# Patient Record
Sex: FEMALE | Race: WHITE | NOT HISPANIC OR LATINO
[De-identification: names, ages, dates, MRNs, and addresses within clinical notes are randomized per-mention and may not be internally consistent; named-entity substitution may affect disease eponyms.]

---

## 2021-04-14 ENCOUNTER — APPOINTMENT (OUTPATIENT)
Dept: VASCULAR SURGERY | Facility: CLINIC | Age: 36
End: 2021-04-14
Payer: MEDICAID

## 2021-04-14 VITALS — DIASTOLIC BLOOD PRESSURE: 78 MMHG | SYSTOLIC BLOOD PRESSURE: 119 MMHG | HEART RATE: 73 BPM

## 2021-04-14 VITALS — BODY MASS INDEX: 14.94 KG/M2 | WEIGHT: 92.99 LBS | TEMPERATURE: 98 F | HEIGHT: 66 IN

## 2021-04-14 PROCEDURE — 93970 EXTREMITY STUDY: CPT

## 2021-04-14 PROCEDURE — 99072 ADDL SUPL MATRL&STAF TM PHE: CPT

## 2021-04-14 PROCEDURE — 99203 OFFICE O/P NEW LOW 30 MIN: CPT

## 2021-04-30 ENCOUNTER — TRANSCRIPTION ENCOUNTER (OUTPATIENT)
Age: 36
End: 2021-04-30

## 2021-05-14 ENCOUNTER — APPOINTMENT (OUTPATIENT)
Dept: VASCULAR SURGERY | Facility: CLINIC | Age: 36
End: 2021-05-14
Payer: MEDICAID

## 2021-05-14 PROCEDURE — 99072 ADDL SUPL MATRL&STAF TM PHE: CPT

## 2021-05-14 PROCEDURE — 36478 ENDOVENOUS LASER 1ST VEIN: CPT | Mod: LT

## 2021-06-02 ENCOUNTER — APPOINTMENT (OUTPATIENT)
Dept: VASCULAR SURGERY | Facility: CLINIC | Age: 36
End: 2021-06-02
Payer: COMMERCIAL

## 2021-06-02 PROCEDURE — 93971 EXTREMITY STUDY: CPT

## 2021-06-02 PROCEDURE — 99213 OFFICE O/P EST LOW 20 MIN: CPT

## 2021-08-13 ENCOUNTER — APPOINTMENT (OUTPATIENT)
Dept: VASCULAR SURGERY | Facility: CLINIC | Age: 36
End: 2021-08-13

## 2021-09-10 ENCOUNTER — APPOINTMENT (OUTPATIENT)
Dept: VASCULAR SURGERY | Facility: CLINIC | Age: 36
End: 2021-09-10
Payer: COMMERCIAL

## 2021-09-10 PROCEDURE — 99213 OFFICE O/P EST LOW 20 MIN: CPT

## 2021-09-10 PROCEDURE — 93971 EXTREMITY STUDY: CPT

## 2021-09-10 NOTE — ASSESSMENT
[FreeTextEntry1] : The patient is a 36 her old female status post left leg endovenous laser ablation of her incompetent greater saphenous vein. The patient tolerated the procedure well. I performed a venous duplex which shows a closed saphenous vein. 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 For the time being she will continue with compression stocking therapy

## 2021-09-10 NOTE — HISTORY OF PRESENT ILLNESS
[FreeTextEntry1] : Status post left leg endovenous laser ablation 2 months ago of an incompetent greater saphenous vein. The patient tolerated the procedure . today she presents complaining of a small area in her ankle and calf but has a residual tributary veins.

## 2021-12-27 ENCOUNTER — ASOB RESULT (OUTPATIENT)
Age: 36
End: 2021-12-27

## 2021-12-27 ENCOUNTER — OUTPATIENT (OUTPATIENT)
Dept: OUTPATIENT SERVICES | Facility: HOSPITAL | Age: 36
LOS: 1 days | Discharge: HOME | End: 2021-12-27

## 2021-12-27 ENCOUNTER — APPOINTMENT (OUTPATIENT)
Dept: ANTEPARTUM | Facility: CLINIC | Age: 36
End: 2021-12-27
Payer: COMMERCIAL

## 2021-12-27 PROCEDURE — 76811 OB US DETAILED SNGL FETUS: CPT | Mod: 26

## 2021-12-27 PROCEDURE — 76817 TRANSVAGINAL US OBSTETRIC: CPT | Mod: 26,59

## 2021-12-27 PROCEDURE — ZZZZZ: CPT

## 2022-01-03 ENCOUNTER — NON-APPOINTMENT (OUTPATIENT)
Age: 37
End: 2022-01-03

## 2022-01-06 ENCOUNTER — APPOINTMENT (OUTPATIENT)
Dept: OBGYN | Facility: CLINIC | Age: 37
End: 2022-01-06
Payer: COMMERCIAL

## 2022-01-06 VITALS
DIASTOLIC BLOOD PRESSURE: 72 MMHG | BODY MASS INDEX: 29.54 KG/M2 | HEIGHT: 72 IN | WEIGHT: 218.13 LBS | SYSTOLIC BLOOD PRESSURE: 120 MMHG

## 2022-01-06 PROCEDURE — 0500F INITIAL PRENATAL CARE VISIT: CPT

## 2022-01-30 ENCOUNTER — NON-APPOINTMENT (OUTPATIENT)
Age: 37
End: 2022-01-30

## 2022-02-03 ENCOUNTER — APPOINTMENT (OUTPATIENT)
Dept: OBGYN | Facility: CLINIC | Age: 37
End: 2022-02-03
Payer: COMMERCIAL

## 2022-02-03 VITALS
WEIGHT: 223.13 LBS | HEIGHT: 72 IN | SYSTOLIC BLOOD PRESSURE: 108 MMHG | BODY MASS INDEX: 30.22 KG/M2 | DIASTOLIC BLOOD PRESSURE: 68 MMHG

## 2022-02-03 PROCEDURE — 0502F SUBSEQUENT PRENATAL CARE: CPT

## 2022-02-03 RX ORDER — FAMOTIDINE 20 MG/1
20 TABLET, FILM COATED ORAL
Qty: 60 | Refills: 6 | Status: ACTIVE | COMMUNITY
Start: 2022-02-03 | End: 1900-01-01

## 2022-02-08 LAB
ALBUMIN SERPL ELPH-MCNC: 3.8 G/DL
ALP BLD-CCNC: 38 U/L
ALT SERPL-CCNC: 18 U/L
ANION GAP SERPL CALC-SCNC: 13 MMOL/L
AST SERPL-CCNC: 14 U/L
BASOPHILS # BLD AUTO: 0.02 K/UL
BASOPHILS NFR BLD AUTO: 0.3 %
BILIRUB SERPL-MCNC: 0.7 MG/DL
BUN SERPL-MCNC: 8 MG/DL
CALCIUM SERPL-MCNC: 9.2 MG/DL
CHLORIDE SERPL-SCNC: 103 MMOL/L
CO2 SERPL-SCNC: 21 MMOL/L
CREAT SERPL-MCNC: 0.5 MG/DL
EOSINOPHIL # BLD AUTO: 0.08 K/UL
EOSINOPHIL NFR BLD AUTO: 1.2 %
GLUCOSE 1H P 50 G GLC PO SERPL-MCNC: 192 MG/DL
GLUCOSE SERPL-MCNC: 192 MG/DL
HCT VFR BLD CALC: 29.3 %
HGB BLD-MCNC: 9.2 G/DL
IMM GRANULOCYTES NFR BLD AUTO: 1.7 %
LYMPHOCYTES # BLD AUTO: 1.94 K/UL
LYMPHOCYTES NFR BLD AUTO: 29.8 %
MAN DIFF?: NORMAL
MCHC RBC-ENTMCNC: 19.7 PG
MCHC RBC-ENTMCNC: 31.4 G/DL
MCV RBC AUTO: 62.6 FL
MONOCYTES # BLD AUTO: 0.38 K/UL
MONOCYTES NFR BLD AUTO: 5.8 %
NEUTROPHILS # BLD AUTO: 3.97 K/UL
NEUTROPHILS NFR BLD AUTO: 61.2 %
PLATELET # BLD AUTO: 219 K/UL
POTASSIUM SERPL-SCNC: 4.4 MMOL/L
PROT SERPL-MCNC: 6.6 G/DL
RBC # BLD: 4.68 M/UL
RBC # FLD: 17.6 %
SODIUM SERPL-SCNC: 137 MMOL/L
WBC # FLD AUTO: 6.5 K/UL

## 2022-02-10 LAB
BILEACIDS T: 2.2 UMOL/L
CHENDEOXYCHOLIC ACID: 0.9 UMOL/L
CHOLIC ACID: 0.6 UMOL/L
DEOXYCHOLIC ACID: 0.7 UMOL/L
URSODEOXYCH: <0.1 UMOL/L

## 2022-02-14 ENCOUNTER — NON-APPOINTMENT (OUTPATIENT)
Age: 37
End: 2022-02-14

## 2022-02-14 LAB
GLUCOSE 1H P 100 G GLC PO SERPL-MCNC: 234 MG/DL
GLUCOSE 2H P CHAL SERPL-MCNC: 208 MG/DL
GLUCOSE 3H P CHAL SERPL-MCNC: 114 MG/DL
GLUCOSE BS SERPL-MCNC: 114 MG/DL

## 2022-02-23 ENCOUNTER — NON-APPOINTMENT (OUTPATIENT)
Age: 37
End: 2022-02-23

## 2022-02-24 ENCOUNTER — APPOINTMENT (OUTPATIENT)
Dept: ANTEPARTUM | Facility: CLINIC | Age: 37
End: 2022-02-24
Payer: COMMERCIAL

## 2022-02-24 ENCOUNTER — APPOINTMENT (OUTPATIENT)
Dept: NUTRITION | Facility: CLINIC | Age: 37
End: 2022-02-24

## 2022-02-24 ENCOUNTER — OUTPATIENT (OUTPATIENT)
Dept: OUTPATIENT SERVICES | Facility: HOSPITAL | Age: 37
LOS: 1 days | Discharge: HOME | End: 2022-02-24

## 2022-02-24 ENCOUNTER — RESULT CHARGE (OUTPATIENT)
Age: 37
End: 2022-02-24

## 2022-02-24 VITALS
DIASTOLIC BLOOD PRESSURE: 68 MMHG | HEART RATE: 83 BPM | OXYGEN SATURATION: 100 % | SYSTOLIC BLOOD PRESSURE: 105 MMHG | BODY MASS INDEX: 36.32 KG/M2 | WEIGHT: 225 LBS | TEMPERATURE: 98.1 F

## 2022-02-24 DIAGNOSIS — I83.893 VARICOSE VEINS OF BILATERAL LOWER EXTREMITIES WITH OTHER COMPLICATIONS: ICD-10-CM

## 2022-02-24 LAB
BILIRUB UR QL STRIP: NORMAL
BP DIAS: 68 MM HG
BP SYS: 105 MM HG
CLARITY UR: CLEAR
COLLECTION METHOD: NORMAL
FETAL MOVEMENT: PRESENT
GLUCOSE BLDC GLUCOMTR-MCNC: 91
GLUCOSE BLDC GLUCOMTR-MCNC: 91 MG/DL — SIGNIFICANT CHANGE UP (ref 70–99)
GLUCOSE UR-MCNC: NORMAL
HBA1C MFR BLD HPLC: 6.2
HCG UR QL: 0.2 EU/DL
HGB UR QL STRIP.AUTO: NORMAL
KETONES UR-MCNC: NORMAL
LEUKOCYTE ESTERASE UR QL STRIP: NORMAL
NITRITE UR QL STRIP: NORMAL
OB COMMENTS: NORMAL
PH UR STRIP: 6
PROT UR STRIP-MCNC: NORMAL
SCHEDULED VISIT: YES
SP GR UR STRIP: 1.01
URINE ALBUMIN/PROTEIN: NORMAL
URINE GLUCOSE: NORMAL
URINE KETONES: NORMAL
WEEKS GESTATION: 27.5

## 2022-02-24 PROCEDURE — 99214 OFFICE O/P EST MOD 30 MIN: CPT

## 2022-02-24 RX ORDER — DIAZEPAM 2 MG/1
2 TABLET ORAL
Qty: 1 | Refills: 0 | Status: DISCONTINUED | COMMUNITY
Start: 2021-04-14 | End: 2022-02-24

## 2022-02-24 RX ORDER — CALCIUM CARBONATE 260MG(650)
TABLET,CHEWABLE ORAL
Refills: 0 | Status: DISCONTINUED | COMMUNITY
End: 2022-02-24

## 2022-02-24 RX ORDER — BLOOD-GLUCOSE METER
W/DEVICE EACH MISCELLANEOUS
Qty: 1 | Refills: 0 | Status: ACTIVE | COMMUNITY
Start: 2022-02-24 | End: 1900-01-01

## 2022-02-24 RX ORDER — SYRING-NEEDL,DISP,INSUL,0.3 ML 30 GX5/16"
SYRINGE, EMPTY DISPOSABLE MISCELLANEOUS
Qty: 1 | Refills: 0 | Status: ACTIVE | COMMUNITY
Start: 2022-02-24 | End: 1900-01-01

## 2022-02-24 NOTE — END OF VISIT
[FreeTextEntry3] : [facility] Staff\par \par I saw and evaluated Ms. BRONSON with Dr. Field.\par \par Counseling: \par \par 1.  The patient will be evaluated by a nutritionist and instructed in adhering to a diabetic diet.\par \par 2.  She was counseled by a nurse and instructed in capillary blood glucose testing (fasting and 2 hours after each meal).\par \par 3.  The significance and usual management of diabetes in pregnancy were reviewed, including risks of preeclampsia, Intra-Uterine Fetal Demise, macrosomia, birth trauma, pre-term labor,  section, NICU admission (the main reasons include  hypoglycemia and  jaundice) and the possible need for insulin therapy.\par \par 4.  Exercise was encouraged.  Ideally, she should walk briskly  after each meal.\par \par Recommendations:\par \par 1.  Glycemic Control.  Target glucose ranges to minimize excessive fetal growth are:  Fasting 60-90 mg/dl; preprandial  mg/dl; and 2-hour postprandial < 120 mg/dl.  If these values are elevated, insulin therapy is encouraged, although oral hypoglycemic agents are reasonable to try depending on the finger stick log.\par \par 2.  Antepartum testing.  Daily fetal movement counts are recommended from 28 weeks.  Weekly or twice weekly biophysical profiles are recommended, the timing will depend on glucose control.  Ultrasound assessment of fetal growth and macrosomic features is recommended.\par \par 3.  Timing of Delivery.  Delivery will most likely occur at around 39 weeks gestation.   If complications, such as preeclampsia, macrosomia or poor glucose control develop, then delivery plans may need to be revised.\par \par 4.  Route of delivery.  Diabetes is associated with about a six-fold risk for shoulder dystocia (which includes the risk of irreversible nerve, bone, and brain injury).  Operative vaginal deliveries should be approached with caution if at all.  Given that she has had 2 previous  deliveries most likely she will have a repeat  delivery.\par \par 5.  Glucose control in labor.  During labor, capillary glucose values should be checked every few hours (depending on their stability).  Insulin may be required to cover elevated glucose levels.\par \par 6.  Long-term diabetes surveillance.  The risk of developing diabetes outside of pregnancy over the next five years may be as high as 50%.  I recommend that she have a 2 hour GTT or similar diabetes screen  at 6 to 12 weeks postpartum with her primary Obstetrician and counseling about ways to minimize her risk.  If her fasting glucose is normal, annual glucose screening by her primary care physician is advised.\par \par Monitor hemoglobin and hematocrit; consider iron studies\par \par Prenatal care is with Dr. Porter\par Fetal movement and labor precautions discussed.\par Estimated fetal weight in two weeks.\par Follow up in two weeks with the fingerstick log.\par \par Ms. Bronson expressed understanding and all of her questions were answered to her satisfaction.  Thank you for this consult.\par \par Mo Banuelos MD\par \par \par \par \par \par \par \par

## 2022-02-24 NOTE — SURGICAL HISTORY
[Breast Disease] : breast disease [Cysts] : cysts [Fibroids] : no fibroids [Abn Paps] : no abnormal pap smears [STI's] : no STI's [Infertility] : no infertility [de-identified] : History of fibroadenoma, s/p lumpectomy, benign. S/p left ovarian cystectomy

## 2022-02-24 NOTE — PHYSICAL EXAM
[FreeTextEntry1] : General: In no apparent distress. \par \par HEENT:  Atraumatic.  Extraocular muscles intact.  \par \par Cardiovascular: Regular rate and rhythm, normal S1/S2 \par \par Pulmonary: Clear to auscultation bilaterally.  No wheezing. \par \par Abdomen: soft, gravid, nontender, nondistended, no rebound, no guarding \par \par Extremities: no calf tenderness or edema \par \par Neurology: +2 reflexes in bilateral upper extremities \par \par Psychiatry:  Happy mood.  Awake, alert, oriented to person, place, time.

## 2022-02-24 NOTE — OB HISTORY
[Pregnancy History] : patient received anesthesia [LIBERTY: ___] : LIBERTY: [unfilled] [___] : no pregnancy complications reported

## 2022-02-24 NOTE — HISTORY OF PRESENT ILLNESS
[FreeTextEntry1] : 36yo  @27w5d by LIBERTY 22 by first trimester sonogram, presents for initial consult for GDM, referred by and co-managed with Dr. Porter\par \par Patient feels well. Denies contractions, vaginal bleeding, leakage of fluid. Reports good fetal movement. \par Denies fever, chills, nausea, vomiting, chest pain, shortness of breath, dysuria, abnormal discharge, leg swelling. \par \par 2/3 ;\par  /234/208/114\par Random FS today: 91 (4 hrs post candy, wheat bread and cheese)\par \par A1C today: 6.2%\par \par OB Hx:\par FT LTCS x 2, patient cannot remember the birthweights\par SAB x 3, D&C x1\par \par Patient denies history of diabetes with previous pregnancies. \par Reports history of uterine septum, s/p hysteroscopic septoplasty in [place]. \par Last sono-@19w2d EFw 318g, MVP 4.55cm, posterior placenta, no previa, no anatomical abnormalities, CL 3.8cm

## 2022-02-24 NOTE — DISCUSSION/SUMMARY
[FreeTextEntry1] : 36yo  @27w5d by LIBERTY 22 by first trimester sonogram, presents for initial consult for GDM, referred by and co-managed with Dr. Porter\par \par #GDM \par - diabetic teaching and nutrition counseling \par - Discussed risks of GDM including but not limited to: macrosomia, shoulder dystocia, increased risk of , stillbirth, NICU admission for hypoglycemia and jaundice, and preeclampsia. Explained that if sugars are well controlled the above complications decrease. \par - FS goal fasting <95, 2 hour PP<120. Encouraged to document FS. \par - Discussed increased risk of type II DM later in life and that we will screen for that after pregnancy.\par -Random FS today: 91 (4hrs post candy, wheat bread and cheese)\par -A1C today: 6.2%\par -sent all supplies to pharmacy\par \par #Obesity \par -We discussed that obesity is associated with a host of pregnancy complications.  \par -The patient is at increased risk for preeclampsia and gestational hypertension,  delivery, macrosomia, gestational diabetes, deep venous thrombosis,  delivery, stillbirth and certain birth defects, such as open neural tube defects. \par - I recommend weekly biophysical profiles to start at around 32 weeks gestation. \par -patient instructed to start taking ASA daily\par \par #Beta-thal minor\par -s/p genetic counseling\par -last h/h 9.2/29.3, MCV 62.6\par - patient taking Iron tabs TID\par -sent colace to pharmacy\par \par #Pregnancy\par -Prenatal care is with Dr. Porter\par -outside medical record reviewed:\par AFP low risk; NIPT low risk/ CF low risk\par Lead <1\par AB pos\par HCV negative\par HBsAg/RPR neg\par Mumps nonimmune\par Measles/Rubella immune\par Hg electrophoresis Beta thal minor\par TSH 1.20\par -anatomy sono normal\par \par RTC 2 weeks with FS logs\par \par \par \par \par

## 2022-02-25 DIAGNOSIS — O34.219 MATERNAL CARE FOR UNSPECIFIED TYPE SCAR FROM PREVIOUS CESAREAN DELIVERY: ICD-10-CM

## 2022-02-25 DIAGNOSIS — Z3A.27 27 WEEKS GESTATION OF PREGNANCY: ICD-10-CM

## 2022-02-25 DIAGNOSIS — O24.419 GESTATIONAL DIABETES MELLITUS IN PREGNANCY, UNSPECIFIED CONTROL: ICD-10-CM

## 2022-02-25 DIAGNOSIS — O09.522 SUPERVISION OF ELDERLY MULTIGRAVIDA, SECOND TRIMESTER: ICD-10-CM

## 2022-02-25 DIAGNOSIS — O99.012 ANEMIA COMPLICATING PREGNANCY, SECOND TRIMESTER: ICD-10-CM

## 2022-02-28 ENCOUNTER — NON-APPOINTMENT (OUTPATIENT)
Age: 37
End: 2022-02-28

## 2022-03-10 ENCOUNTER — APPOINTMENT (OUTPATIENT)
Dept: ANTEPARTUM | Facility: CLINIC | Age: 37
End: 2022-03-10
Payer: COMMERCIAL

## 2022-03-10 ENCOUNTER — RESULT CHARGE (OUTPATIENT)
Age: 37
End: 2022-03-10

## 2022-03-10 ENCOUNTER — NON-APPOINTMENT (OUTPATIENT)
Age: 37
End: 2022-03-10

## 2022-03-10 ENCOUNTER — ASOB RESULT (OUTPATIENT)
Age: 37
End: 2022-03-10

## 2022-03-10 ENCOUNTER — APPOINTMENT (OUTPATIENT)
Dept: OBGYN | Facility: CLINIC | Age: 37
End: 2022-03-10
Payer: COMMERCIAL

## 2022-03-10 ENCOUNTER — OUTPATIENT (OUTPATIENT)
Dept: OUTPATIENT SERVICES | Facility: HOSPITAL | Age: 37
LOS: 1 days | Discharge: HOME | End: 2022-03-10

## 2022-03-10 VITALS
SYSTOLIC BLOOD PRESSURE: 110 MMHG | HEIGHT: 66 IN | WEIGHT: 220.38 LBS | BODY MASS INDEX: 35.42 KG/M2 | DIASTOLIC BLOOD PRESSURE: 66 MMHG

## 2022-03-10 VITALS
SYSTOLIC BLOOD PRESSURE: 116 MMHG | HEART RATE: 86 BPM | BODY MASS INDEX: 35.91 KG/M2 | TEMPERATURE: 98 F | DIASTOLIC BLOOD PRESSURE: 69 MMHG | WEIGHT: 222.5 LBS | OXYGEN SATURATION: 98 %

## 2022-03-10 LAB
BILIRUB UR QL STRIP: NORMAL
BP DIAS: 69 MM HG
BP SYS: 116 MM HG
CLARITY UR: CLEAR
COLLECTION METHOD: NORMAL
FETAL MOVEMENT: PRESENT
GLUCOSE BLDC GLUCOMTR-MCNC: 115
GLUCOSE BLDC GLUCOMTR-MCNC: 115 MG/DL — HIGH (ref 70–99)
GLUCOSE UR-MCNC: NORMAL
HCG UR QL: 0.1 EU/DL
HGB UR QL STRIP.AUTO: NORMAL
KETONES UR-MCNC: NORMAL
LEUKOCYTE ESTERASE UR QL STRIP: NORMAL
NITRITE UR QL STRIP: NORMAL
OB COMMENTS: NORMAL
PH UR STRIP: 6.5
PROT UR STRIP-MCNC: NORMAL
SCHEDULED VISIT: YES
SP GR UR STRIP: 1.02
URINE ALBUMIN/PROTEIN: NORMAL
URINE GLUCOSE: NORMAL
URINE KETONES: NORMAL
WEEKS GESTATION: 29.5

## 2022-03-10 PROCEDURE — 0502F SUBSEQUENT PRENATAL CARE: CPT

## 2022-03-10 PROCEDURE — 76817 TRANSVAGINAL US OBSTETRIC: CPT | Mod: 26

## 2022-03-10 PROCEDURE — 76816 OB US FOLLOW-UP PER FETUS: CPT | Mod: 26

## 2022-03-10 PROCEDURE — 99214 OFFICE O/P EST MOD 30 MIN: CPT | Mod: 25

## 2022-03-10 RX ORDER — LANCETS 33 GAUGE
EACH MISCELLANEOUS
Qty: 120 | Refills: 4 | Status: ACTIVE | COMMUNITY
Start: 2022-02-24 | End: 1900-01-01

## 2022-03-10 RX ORDER — PEN NEEDLE, DIABETIC 32GX 5/32"
32G X 4 MM NEEDLE, DISPOSABLE MISCELLANEOUS
Qty: 100 | Refills: 3 | Status: ACTIVE | COMMUNITY
Start: 2022-03-10 | End: 1900-01-01

## 2022-03-10 RX ORDER — LANOLIN ALCOHOL/MO/W.PET/CERES
4 CREAM (GRAM) TOPICAL
Qty: 4 | Refills: 0 | Status: ACTIVE | COMMUNITY
Start: 2022-03-10 | End: 1900-01-01

## 2022-03-10 RX ORDER — ISOPROPYL ALCOHOL 70 ML/100ML
SWAB TOPICAL
Qty: 100 | Refills: 5 | Status: ACTIVE | COMMUNITY
Start: 2022-03-10 | End: 1900-01-01

## 2022-03-10 RX ORDER — BLOOD SUGAR DIAGNOSTIC
STRIP MISCELLANEOUS 4 TIMES DAILY
Qty: 120 | Refills: 6 | Status: ACTIVE | COMMUNITY
Start: 2022-02-24 | End: 1900-01-01

## 2022-03-10 RX ORDER — INSULIN HUMAN 100 [IU]/ML
100 INJECTION, SUSPENSION SUBCUTANEOUS AT BEDTIME
Qty: 5 | Refills: 0 | Status: ACTIVE | COMMUNITY
Start: 2022-03-10 | End: 1900-01-01

## 2022-03-10 NOTE — HISTORY OF PRESENT ILLNESS
[FreeTextEntry1] : 38yo  @29w5d by LIBERTY 22 by first trimester sonogram, presents for follow-up for GDM, referred by and co-managed with Dr. Porter\par \par Patient feels well. Reports feeling of pelvic pressure. Ordered by Dr. Porter to have TVUS today. Denies contractions, vaginal bleeding, leakage of fluid. Reports good fetal movement. \par Denies fever, chills, nausea, vomiting, chest pain, shortness of breath, dysuria, abnormal discharge, leg swelling. \par \par -Random FS today: 115 (3hrs after whole wheat bread and meat)\par FS logs reviewed:\par **fastin-135 \par **2hr postprandial: \par \par Historical values:\par A1C 6.2%\par 2/3 ;\par  /234/208/114\par  \par \par

## 2022-03-10 NOTE — OB HISTORY
[FreeTextEntry1] : Past Pregnancies: \par #1 (14):  delivery of unknown girl, at 39 weeks GA . patient received anesthesia. Delivery occurred at. ([place]) . no pregnancy complications reported. Pregnancy #1 Comments: (Primary  for breech presentation). \par #2 (18):  delivery of unknown boy, at 39 weeks GA . patient received anesthesia. Delivery occurred at. ([place]) . no pregnancy complications reported. Pregnancy #2 Comments: (Category 2 tracing, failed TOLAC). \par \par Current Pregnancy: LIBERTY: 2022.

## 2022-03-10 NOTE — END OF VISIT
[FreeTextEntry3] : [facility] Staff\par \par I saw and evaluated Ms. BRONSON with Dr. Field.\par \par GDM\par - Fasting values:  108-135\par - 2 hour postprandial values:  \par \par Not all of the values are filled in; the more recent postprandial values are better controlled.\par \par Insulin is the first line medication to treat elevated blood sugars in pregnancy.  It does not cross the placenta.  It can cause hypoglycemia.  Metformin is an alternative medication  that can be used.  It does cross the placenta.  Short term data regarding the use of Metformin in pregnancy and the effects on the  have been reassuring.  However long term data are lacking.  Metformin can cause GI upset, and a CMP should be checked when using Metformin.Ms. Bronson desires to start insulin.  We will start NPH 10 units at night (this is less than the weight based regimen as she has not been on insulin before).  I will not start NPH in the morning or rapid acting insulin with meals yet because Ms. Bronson is making dietary modifications.  Hypoglycemia precautions were discussed.  Glucagon and glucose tablets will be sent to her pharmacy.\par \par Prenatal care is with Dr. Porter.\par Fetal movement and labor precautions were discussed.\par Weekly biophysical profiles with non stress tests to start at around 32 weeks gestation.\par Follow up in one week with the fingerstick log.\par \par Mo Banuelos MD\par \par \par \par \par

## 2022-03-10 NOTE — PHYSICAL EXAM
[FreeTextEntry1] : On physical exam, General: In no apparent distress. \par \par HEENT: Atraumatic. Extraocular muscles intact. \par \par Cardiovascular: Regular rate and rhythm, normal S1/S2 \par \par Pulmonary: Clear to auscultation bilaterally. No wheezing. \par \par Abdomen: soft, gravid, nontender, nondistended, no rebound, no guarding \par \par Extremities: no calf tenderness or edema \par \par Neurology: +2 reflexes in bilateral upper extremities \par \par Psychiatry: Happy mood. Awake, alert, oriented to person, place, time.

## 2022-03-10 NOTE — DISCUSSION/SUMMARY
[FreeTextEntry1] : 36yo  @29w5d by LIBERTY 22 by first trimester sonogram, presents for follow-up for GDM, referred by and co-managed with Dr. Porter\par \par #GDM \par - diabetic teaching and nutrition counseling \par - previously discussed risks of GDM including but not limited to: macrosomia, shoulder dystocia, increased risk of , stillbirth, NICU admission for hypoglycemia and jaundice, and preeclampsia. Explained that if sugars are well controlled the above complications decrease. \par - FS goal fasting <95, 2 hour PP<120. Encouraged to document FS. \par - Discussed increased risk of type II DM later in life and that we will screen for that after pregnancy.\par -Random FS today: 115 (3hrs after whole wheat bread and meat)\par FS logs reviewed:\par **fastin-135 \par **2hr postprandial: \par - A1C 6.2%\par -****Will start patient on NPH 10U at bedtime\par -all supplies sent to pharmacy\par \par #Obesity \par -We discussed that obesity is associated with a host of pregnancy complications. \par -The patient is at increased risk for preeclampsia and gestational hypertension,  delivery, macrosomia, gestational diabetes, deep venous thrombosis,  delivery, stillbirth and certain birth defects, such as open neural tube defects. \par - I recommend weekly biophysical profiles to start at around 32 weeks gestation. \par -continue taking ASA daily\par \par #Beta-thal minor - FOB is negative\par -s/p genetic counseling\par -last h/h 9.2/29.3, MCV 62.6\par - patient taking Iron tabs TID\par -sent Iron studies\par \par #Pregnancy\par -Prenatal care is with Dr. Porter\par -AFP low risk; NIPT low risk/ CF low risk\par -anatomy sono normal\par -f/u Today's sonogram\par \par RTC 2 weeks with FS logs

## 2022-03-15 LAB
ALBUMIN SERPL ELPH-MCNC: 4.1 G/DL
ALP BLD-CCNC: 45 U/L
ALT SERPL-CCNC: 28 U/L
ANION GAP SERPL CALC-SCNC: 18 MMOL/L
AST SERPL-CCNC: 18 U/L
BILIRUB SERPL-MCNC: 0.6 MG/DL
BUN SERPL-MCNC: 13 MG/DL
CALCIUM SERPL-MCNC: 9.7 MG/DL
CHLORIDE SERPL-SCNC: 102 MMOL/L
CO2 SERPL-SCNC: 19 MMOL/L
CREAT SERPL-MCNC: 0.6 MG/DL
EGFR: 118 ML/MIN/1.73M2
FERRITIN SERPL-MCNC: 138 NG/ML
FOLATE SERPL-MCNC: 15 NG/ML
GLUCOSE SERPL-MCNC: 97 MG/DL
IRON SATN MFR SERPL: 18 %
IRON SERPL-MCNC: 70 UG/DL
POTASSIUM SERPL-SCNC: 4.5 MMOL/L
PROT SERPL-MCNC: 6.8 G/DL
SODIUM SERPL-SCNC: 139 MMOL/L
TIBC SERPL-MCNC: 382 UG/DL
TRANSFERRIN SERPL-MCNC: 317 MG/DL
UIBC SERPL-MCNC: 312 UG/DL
VIT B12 SERPL-MCNC: 207 PG/ML

## 2022-03-16 ENCOUNTER — NON-APPOINTMENT (OUTPATIENT)
Age: 37
End: 2022-03-16

## 2022-03-17 ENCOUNTER — RESULT CHARGE (OUTPATIENT)
Age: 37
End: 2022-03-17

## 2022-03-17 ENCOUNTER — OUTPATIENT (OUTPATIENT)
Dept: OUTPATIENT SERVICES | Facility: HOSPITAL | Age: 37
LOS: 1 days | Discharge: HOME | End: 2022-03-17

## 2022-03-17 ENCOUNTER — APPOINTMENT (OUTPATIENT)
Dept: NUTRITION | Facility: CLINIC | Age: 37
End: 2022-03-17

## 2022-03-17 ENCOUNTER — APPOINTMENT (OUTPATIENT)
Dept: ANTEPARTUM | Facility: CLINIC | Age: 37
End: 2022-03-17
Payer: COMMERCIAL

## 2022-03-17 VITALS
DIASTOLIC BLOOD PRESSURE: 59 MMHG | SYSTOLIC BLOOD PRESSURE: 116 MMHG | TEMPERATURE: 98.2 F | WEIGHT: 220 LBS | OXYGEN SATURATION: 100 % | HEIGHT: 66 IN | BODY MASS INDEX: 35.36 KG/M2 | HEART RATE: 83 BPM

## 2022-03-17 LAB
BP DIAS: 59 MM HG
BP SYS: 116 MM HG
FETAL HEART RATE (BPM): 152
FETAL MOVEMENT: PRESENT
OB COMMENTS: NORMAL
SCHEDULED VISIT: YES
URINE ALBUMIN/PROTEIN: NORMAL
URINE GLUCOSE: 100
URINE KETONES: NORMAL
WEEKS GESTATION: 30.5

## 2022-03-17 PROCEDURE — 99213 OFFICE O/P EST LOW 20 MIN: CPT | Mod: 25

## 2022-03-17 RX ORDER — MECOBALAMIN 1000 MCG
1 TABLET,CHEWABLE ORAL DAILY
Qty: 30 | Refills: 2 | Status: ACTIVE | COMMUNITY
Start: 2022-03-17 | End: 1900-01-01

## 2022-03-17 RX ORDER — METFORMIN HYDROCHLORIDE 500 MG/1
500 TABLET, COATED ORAL
Qty: 60 | Refills: 1 | Status: ACTIVE | COMMUNITY
Start: 2022-03-17 | End: 1900-01-01

## 2022-03-18 DIAGNOSIS — O26.899 OTHER SPECIFIED PREGNANCY RELATED CONDITIONS, UNSPECIFIED TRIMESTER: ICD-10-CM

## 2022-03-18 DIAGNOSIS — O24.414 GESTATIONAL DIABETES MELLITUS IN PREGNANCY, INSULIN CONTROLLED: ICD-10-CM

## 2022-03-18 DIAGNOSIS — Z3A.29 29 WEEKS GESTATION OF PREGNANCY: ICD-10-CM

## 2022-03-18 DIAGNOSIS — O09.523 SUPERVISION OF ELDERLY MULTIGRAVIDA, THIRD TRIMESTER: ICD-10-CM

## 2022-03-18 DIAGNOSIS — D56.3 THALASSEMIA MINOR: ICD-10-CM

## 2022-03-22 LAB
BILIRUB UR QL STRIP: NORMAL
CLARITY UR: CLEAR
COLLECTION METHOD: NORMAL
GLUCOSE BLDC GLUCOMTR-MCNC: 133
GLUCOSE UR-MCNC: 100
HCG UR QL: 0.2 EU/DL
HGB UR QL STRIP.AUTO: NORMAL
KETONES UR-MCNC: NORMAL
LEUKOCYTE ESTERASE UR QL STRIP: NORMAL
NITRITE UR QL STRIP: NORMAL
PH UR STRIP: 6.5
PROT UR STRIP-MCNC: NORMAL
SP GR UR STRIP: 1.02

## 2022-03-22 NOTE — HISTORY OF PRESENT ILLNESS
[FreeTextEntry1] : 36yo  @30w5d by LIBERTY 22 by first trimester sonogram, presents for follow-up for GDM, referred by and co-managed with Dr. Porter\par \par Patient feels well. Denies contractions, vaginal bleeding, leakage of fluid. Reports good fetal movement. \par Denies fever, chills, nausea, vomiting, chest pain, shortness of breath, dysuria, abnormal discharge, leg swelling. \par \par Patient was started on NPH 10U at bedtime 1 week ago. She reports self-injecting every night since 3/11 without issues. However, she reports experiencing cramping on her toes and one episode of lightheadedness with vision changes. She did not take her FS at that time. \par \par -Random FS today: 133 (2.5hrs postprandial)\par 3/12- FS logs reviewed:\par **fastin-132 (5 of 6 elevated values)\par **2hr postprandial:  (7 of 15 values elevated)\par \par Historical values:\par A1C 6.2%\par 2/3 ;\par  /234/208/114

## 2022-03-22 NOTE — OB HISTORY
[FreeTextEntry1] : Past Pregnancies: \par #1 (14):  delivery of unknown girl, at 39 weeks GA . patient received anesthesia. Delivery occurred at. ([place]) . no pregnancy complications reported. Pregnancy #1 Comments: (Primary  for breech presentation). \par #2 (18):  delivery of unknown boy, at 39 weeks GA . patient received anesthesia. Delivery occurred at. ([place]) . no pregnancy complications reported. Pregnancy #2 Comments: (Category 2 tracing, failed TOLAC). \par \par Current Pregnancy: LIBERTY: 2022.

## 2022-03-22 NOTE — DISCUSSION/SUMMARY
[FreeTextEntry1] : 38yo  @30w5d by LIBERTY 22 by first trimester sonogram, presents for follow-up for GDM, referred by and co-managed with Dr. Porter\par \par #GDMA2 \par - diabetic teaching and nutrition counseling \par - FS goal fasting <95, 2 hour PP<120. Encouraged to document FS. \par - Discussed increased risk of type II DM later in life and that we will screen for that after pregnancy.\par -Random FS today: 133 (2.5hrs postprandial)\par 3/12- FS logs reviewed:\par **fastin-132 (5 of 6 elevated values)\par **2hr postprandial:  (7 of 15 values elevated)\par CURRENT REGIMEN: NPH 10U QHS\par NEW REGIMEN; NPH 10U QHS, Metformin 500mg BID\par -Will start patient on Dexcom monitor next visit-- supplies sent to pharmacy\par \par #Obesity - BMI 35\par -We discussed that obesity is associated with a host of pregnancy complications. \par -The patient is at increased risk for preeclampsia and gestational hypertension,  delivery, macrosomia, gestational diabetes, deep venous thrombosis,  delivery, stillbirth and certain birth defects, such as open neural tube defects. \par -continue taking ASA daily\par \par #Beta-thal minor - FOB is negative\par -s/p genetic counseling\par -last h/h 9.2/29.3, MCV 62.6\par - patient taking Iron tabs TID\par -Iron studies sent from last visit: normal iron, ferritin, and folate levels, low B12\par -sent B12 pills to pharmacy\par \par #Pregnancy\par -Prenatal care is with Dr. Porter\par -AFP low risk; NIPT low risk/ CF low risk\par -anatomy sono normal\par -Last EFW at 29w5d 1489g (46%)\par \par RTC 1 week with FS logs\par \par

## 2022-03-23 ENCOUNTER — NON-APPOINTMENT (OUTPATIENT)
Age: 37
End: 2022-03-23

## 2022-03-24 ENCOUNTER — OUTPATIENT (OUTPATIENT)
Dept: OUTPATIENT SERVICES | Facility: HOSPITAL | Age: 37
LOS: 1 days | Discharge: HOME | End: 2022-03-24

## 2022-03-24 ENCOUNTER — APPOINTMENT (OUTPATIENT)
Dept: NUTRITION | Facility: CLINIC | Age: 37
End: 2022-03-24

## 2022-03-24 ENCOUNTER — APPOINTMENT (OUTPATIENT)
Dept: ANTEPARTUM | Facility: CLINIC | Age: 37
End: 2022-03-24
Payer: COMMERCIAL

## 2022-03-24 ENCOUNTER — RESULT CHARGE (OUTPATIENT)
Age: 37
End: 2022-03-24

## 2022-03-24 ENCOUNTER — APPOINTMENT (OUTPATIENT)
Dept: OBGYN | Facility: CLINIC | Age: 37
End: 2022-03-24
Payer: COMMERCIAL

## 2022-03-24 VITALS
DIASTOLIC BLOOD PRESSURE: 72 MMHG | WEIGHT: 222 LBS | BODY MASS INDEX: 35.68 KG/M2 | SYSTOLIC BLOOD PRESSURE: 122 MMHG | HEIGHT: 66 IN

## 2022-03-24 LAB
BP DIAS: 76 MM HG
BP SYS: 120 MM HG
FETAL MOVEMENT: PRESENT
OB COMMENTS: NORMAL
SCHEDULED VISIT: YES
URINE ALBUMIN/PROTEIN: NORMAL
URINE GLUCOSE: NORMAL
URINE KETONES: NORMAL
WEEKS GESTATION: 31.5

## 2022-03-24 PROCEDURE — 0502F SUBSEQUENT PRENATAL CARE: CPT

## 2022-03-24 PROCEDURE — 99213 OFFICE O/P EST LOW 20 MIN: CPT

## 2022-03-28 NOTE — HISTORY OF PRESENT ILLNESS
[FreeTextEntry1] : 36yo  @31w5d by LIBERTY 22 by first trimester sonogram, presents for follow-up for GDMa2, referred by and co-managed with Dr. Porter\par \par Patient feels well. Denies contractions, vaginal bleeding, leakage of fluid. Reports good fetal movement. \par Denies fever, chills, nausea, vomiting, chest pain, shortness of breath, dysuria, abnormal discharge, leg swelling. \par \par Patient was started on NPH 10U at bedtime 2 weeks ago. Metforming 500mg q12hr added 1 week ago for persistently high fasting and post dinner values. Dexcom was sent to pharmacy and patient has it set up today.\par \par -Random FS today: 129 (2.5hrs postprandial)\par - logs reviewed\par **fastin-127 (all elevated values)\par **2hr postprandial:  (5 of 17 values elevated)\par \par Historical values:\par  A1C 6.2%\par 2/3 ;\par  /234/208/114

## 2022-03-28 NOTE — OB HISTORY
[FreeTextEntry1] : Past Pregnancies: \par #1 (14):  delivery of unknown girl, at 39 weeks GA . patient received anesthesia. Delivery occurred at. ([place]) . no pregnancy complications reported. Pregnancy #1 Comments: (Primary  for breech presentation). \par #2 (18):  delivery of unknown boy, at 39 weeks GA . patient received anesthesia. Delivery occurred at. ([place]) . no pregnancy complications reported. Pregnancy #2 Comments: (Category 2 tracing, failed TOLAC). \par \par Current Pregnancy: LIBERTY: 2022.

## 2022-03-28 NOTE — END OF VISIT
[] : Resident [FreeTextEntry3] : 38 y/o P2 at 31w5d with GDM, feeling well, no complaints, good FM.\par Improved control since last week. Increase Metformin to 500mg/ 1000mg. Continue NPH 10U HS.\par Starting Dexcom.\par RTC in 1 w. Start  testing at 32 weeks.

## 2022-03-28 NOTE — PHYSICAL EXAM
[FreeTextEntry1] : On physical exam, On physical exam, On physical exam, General: In no apparent distress. \par \par HEENT: Atraumatic. Extraocular muscles intact. \par \par Cardiovascular: Regular rate and rhythm, normal S1/S2 \par \par Pulmonary: Clear to auscultation bilaterally. No wheezing. \par \par Abdomen: soft, gravid, nontender, nondistended, no rebound, no guarding \par \par Extremities: no calf tenderness or edema \par \par Neurology: +2 reflexes in bilateral upper extremities \par \par Psychiatry: Happy mood. Awake, alert, oriented to person, place, time.

## 2022-03-28 NOTE — DISCUSSION/SUMMARY
[FreeTextEntry1] : 36yo  @31w5d by LIBERTY 22 by first trimester sonogram, presents for follow-up for GDM, referred by and co-managed with Dr. Porter\par \par #GDMA2 \par - diabetic teaching and nutrition counseling \par - Dexcom set up today\par - FS goal fasting <95, 2 hour PP<120. Encouraged to document FS. \par - Discussed increased risk of type II DM later in life and that we will screen for that after pregnancy.\par -Random FS today: 129 (2.5hrs postprandial)\par - logs reviewed\par **fastin-127 (all elevated values)\par **2hr postprandial:  (5 of 17 values elevated)\par CURRENT REGIMEN; NPH 10U QHS, Metformin 500mg BID\par NEW REGIMEN: NPH 10U QHS, Metformin 500mg IN AM, 1000mg in PM\par \par #Obesity - BMI 35\par -The patient is at increased risk for preeclampsia and gestational hypertension,  delivery, macrosomia, gestational diabetes, deep venous thrombosis,  delivery, stillbirth and certain birth defects, such as open neural tube defects. \par -continue taking ASA daily\par \par #Beta-thal minor - FOB is negative\par -s/p genetic counseling\par -last h/h 9.2/29.3, MCV 62.6\par - patient taking Iron tabs TID, Vit B12 daily\par -Iron studies sent from last visit: normal iron, ferritin, and folate levels, low B12\par \par #Pregnancy\par -Prenatal care is with Dr. Porter\par -AFP low risk; NIPT low risk/ CF low risk\par -anatomy sono normal\par -Last EFW at 29w5d 1489g (46%)\par \par RTC 1 week , will start weekly BPPs/NSTs

## 2022-03-30 ENCOUNTER — NON-APPOINTMENT (OUTPATIENT)
Age: 37
End: 2022-03-30

## 2022-03-31 ENCOUNTER — APPOINTMENT (OUTPATIENT)
Dept: ANTEPARTUM | Facility: CLINIC | Age: 37
End: 2022-03-31
Payer: COMMERCIAL

## 2022-03-31 ENCOUNTER — OUTPATIENT (OUTPATIENT)
Dept: OUTPATIENT SERVICES | Facility: HOSPITAL | Age: 37
LOS: 1 days | Discharge: HOME | End: 2022-03-31

## 2022-03-31 ENCOUNTER — ASOB RESULT (OUTPATIENT)
Age: 37
End: 2022-03-31

## 2022-03-31 VITALS
DIASTOLIC BLOOD PRESSURE: 73 MMHG | TEMPERATURE: 98.3 F | WEIGHT: 225 LBS | OXYGEN SATURATION: 100 % | HEART RATE: 96 BPM | SYSTOLIC BLOOD PRESSURE: 116 MMHG | BODY MASS INDEX: 36.32 KG/M2

## 2022-03-31 DIAGNOSIS — O24.414 GESTATIONAL DIABETES MELLITUS IN PREGNANCY, INSULIN CONTROLLED: ICD-10-CM

## 2022-03-31 DIAGNOSIS — Z3A.32 32 WEEKS GESTATION OF PREGNANCY: ICD-10-CM

## 2022-03-31 LAB
FETAL MOVEMENT: PRESENT
OB COMMENTS: NORMAL
SCHEDULED VISIT: YES
URINE ALBUMIN/PROTEIN: NORMAL
URINE GLUCOSE: NEGATIVE
URINE KETONES: NORMAL
WEEKS GESTATION: 32.5

## 2022-03-31 PROCEDURE — 76818 FETAL BIOPHYS PROFILE W/NST: CPT | Mod: 26

## 2022-03-31 PROCEDURE — ZZZZZ: CPT

## 2022-03-31 PROCEDURE — 99213 OFFICE O/P EST LOW 20 MIN: CPT | Mod: 25

## 2022-03-31 RX ORDER — INSULIN LISPRO 100 [IU]/ML
100 INJECTION, SOLUTION SUBCUTANEOUS
Qty: 5 | Refills: 0 | Status: ACTIVE | COMMUNITY
Start: 2022-03-31 | End: 1900-01-01

## 2022-04-04 NOTE — PHYSICAL EXAM
[FreeTextEntry1] : On physical exam, On physical exam, On physical exam, On physical exam, General: In no apparent distress. \par \par HEENT: Atraumatic. Extraocular muscles intact. \par \par Cardiovascular: Regular rate and rhythm, normal S1/S2 \par \par Pulmonary: Clear to auscultation bilaterally. No wheezing. \par \par Abdomen: soft, gravid, nontender, nondistended, no rebound, no guarding \par \par Extremities: no calf tenderness or edema \par \par Neurology: +2 reflexes in bilateral upper extremities \par \par Psychiatry: Happy mood. Awake, alert, oriented to person, place, time. \par

## 2022-04-04 NOTE — OB HISTORY
[FreeTextEntry1] : Past Pregnancies: \par #1 (14):  delivery of unknown girl, at 39 weeks GA . patient received anesthesia. Delivery occurred at. ([place]) . no pregnancy complications reported. Pregnancy #1 Comments: (Primary  for breech presentation). \par #2 (18):  delivery of unknown boy, at 39 weeks GA . patient received anesthesia. Delivery occurred at. ([place]) . no pregnancy complications reported. Pregnancy #2 Comments: (Category 2 tracing, failed TOLAC). \par \par Current Pregnancy: LIBERTY: 2022.

## 2022-04-04 NOTE — END OF VISIT
[] : Resident [FreeTextEntry3] : GDM, doing well.\par CGM reviewd, elevated post breakfast and post-dinner. Add short acting insulin. \par Skipping lunch, advised that she can't skip lunch or at least needs a snack.\par Hypoglycemia precautions.\par Weekly  testing.\par RTC in 2 w.

## 2022-04-04 NOTE — DISCUSSION/SUMMARY
[FreeTextEntry1] : 36yo  @32w5d by LIBERTY 22 by first trimester sonogram, presents for follow-up for GDMA2, referred by and co-managed with Dr. Porter\par \par #GDMA2 \par - diabetic teaching and nutrition counseling \par - Dexcom in place\par - FS goal fasting <95, 2 hour PP<120. Encouraged to document FS. \par - Discussed increased risk of type II DM later in life and that we will screen for that after pregnancy.\par -Random FS today: 101 (last meal chicken with lettuce and tomatoes 2 hours ago)\par Dexcom in place: Most values within expected range, post-breakfast and post-dinner values above normal. Patient skips lunch sometimes, some low values noted around 60-70s. Discussed covering post-meal values with fast-acting. \par CURRENT REGIMEN: NPH 10U QHS, Metformin 500mg IN AM, 1000mg in PM\par NEW REGIMEN:  NPH 10U QHS, Lispro 4/x/4, Metformin 500mg IN AM, 1000mg in PM\par \par #Obesity - BMI 35\par -The patient is at increased risk for preeclampsia and gestational hypertension,  delivery, macrosomia, gestational diabetes, deep venous thrombosis,  delivery, stillbirth and certain birth defects, such as open neural tube defects. \par -continue taking ASA daily\par \par #Beta-thal minor - FOB is negative\par -s/p genetic counseling\par -last h/h 9.2/29.3, MCV 62.6\par - patient taking Iron tabs TID, Vit B12 daily\par \par #Pregnancy\par -Prenatal care is with Dr. Porter\par -AFP low risk; NIPT low risk/ CF low risk\par -anatomy sono normal\par -Last EFW at 29w5d 1489g (46%)\par - f/u  testing tbd today\par \par RTC 2 weeks , continue weekly BPPs/NSTs

## 2022-04-07 ENCOUNTER — APPOINTMENT (OUTPATIENT)
Dept: ANTEPARTUM | Facility: CLINIC | Age: 37
End: 2022-04-07
Payer: MEDICAID

## 2022-04-07 ENCOUNTER — RESULT CHARGE (OUTPATIENT)
Age: 37
End: 2022-04-07

## 2022-04-07 ENCOUNTER — ASOB RESULT (OUTPATIENT)
Age: 37
End: 2022-04-07

## 2022-04-07 ENCOUNTER — APPOINTMENT (OUTPATIENT)
Dept: OBGYN | Facility: CLINIC | Age: 37
End: 2022-04-07
Payer: MEDICAID

## 2022-04-07 ENCOUNTER — OUTPATIENT (OUTPATIENT)
Dept: OUTPATIENT SERVICES | Facility: HOSPITAL | Age: 37
LOS: 1 days | Discharge: HOME | End: 2022-04-07

## 2022-04-07 VITALS
OXYGEN SATURATION: 100 % | TEMPERATURE: 98.2 F | SYSTOLIC BLOOD PRESSURE: 110 MMHG | WEIGHT: 223 LBS | HEART RATE: 87 BPM | BODY MASS INDEX: 35.99 KG/M2 | DIASTOLIC BLOOD PRESSURE: 67 MMHG

## 2022-04-07 VITALS
HEIGHT: 66 IN | DIASTOLIC BLOOD PRESSURE: 67 MMHG | SYSTOLIC BLOOD PRESSURE: 110 MMHG | BODY MASS INDEX: 35.84 KG/M2 | WEIGHT: 223 LBS

## 2022-04-07 LAB
BILIRUB UR QL STRIP: NEGATIVE
CLARITY UR: CLEAR
COLLECTION METHOD: NORMAL
FETAL HEART DESCRIPTION: NORMAL
FETAL HEART RATE (BPM): 140
FETAL MOVEMENT: PRESENT
GLUCOSE BLDC GLUCOMTR-MCNC: 74
GLUCOSE BLDC GLUCOMTR-MCNC: 74 MG/DL — SIGNIFICANT CHANGE UP (ref 70–99)
GLUCOSE UR-MCNC: NEGATIVE
HCG UR QL: 0.2 EU/DL
HGB UR QL STRIP.AUTO: NEGATIVE
KETONES UR-MCNC: NEGATIVE
LEUKOCYTE ESTERASE UR QL STRIP: NEGATIVE
NITRITE UR QL STRIP: NEGATIVE
OB COMMENTS: NORMAL
PH UR STRIP: 6
PROT UR STRIP-MCNC: NEGATIVE
SCHEDULED VISIT: YES
SP GR UR STRIP: 1.02
URINE ALBUMIN/PROTEIN: NEGATIVE
URINE GLUCOSE: NEGATIVE
URINE KETONES: NEGATIVE
WEEKS GESTATION: 33.5

## 2022-04-07 PROCEDURE — 99214 OFFICE O/P EST MOD 30 MIN: CPT

## 2022-04-07 PROCEDURE — 76818 FETAL BIOPHYS PROFILE W/NST: CPT | Mod: 26

## 2022-04-07 PROCEDURE — 76816 OB US FOLLOW-UP PER FETUS: CPT | Mod: 26

## 2022-04-07 PROCEDURE — ZZZZZ: CPT

## 2022-04-07 PROCEDURE — 99214 OFFICE O/P EST MOD 30 MIN: CPT | Mod: 25

## 2022-04-07 NOTE — DISCUSSION/SUMMARY
[FreeTextEntry1] : 38yo  @33w5d by LIBERTY 22 by first trimester sonogram, presents for follow-up for GDMA2, referred by and co-managed with Dr. Porter\par \par #GDMA2 \par - Dexcom in place.  I was able to print out the graphs.  She seems to have low values  after breakfast and occasionally over night.\par . \par CURRENT REGIMEN: NPH 10 unit  QHS, Metformin 500mg in AM, 1000mg in PM, Lispro 4 units with breakfast, and 4 units with dinner.\par NEW REGIMEN:  NPH 4 units with breakfast, 14 units at bedtime.  Rapid acting insulin 4 units with dinner.   She should eat a small ricecake with peanut butter before she goes to bed. 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 Porter\par -AFP low risk; NIPT low risk/ CF low risk\par -anatomy sono normal\par -Last EFW at 33w5d 2257g (42%)\par -Fetal movement and labor precautions discussed.\par -Weekly biophysical profiles with non stress tests\par -RTC 1 week with the fingerstick log.\par \par Mo Banuelos MD

## 2022-04-07 NOTE — HISTORY OF PRESENT ILLNESS
[FreeTextEntry1] : 38yo  @33w5d by LIBERTY 22 by first trimester sonogram, presents for follow-up for GDMA2, referred by and co-managed with Dr. Porter\par \par Patient feels well. Denies contractions, vaginal bleeding, leakage of fluid. Reports good fetal movement. \par Denies fever, chills, nausea, vomiting, chest pain, shortness of breath, dysuria, abnormal discharge, leg swelling.   She is feeling more tired.  She is still skipping some meals by lunch.\par \par Patient is currently on NPH 10U at bedtime,. Metformin 500 mg in AM, and 1000 mg in PM, Lispro 4 units with breakfast and 4 units with dinner.  She ran out of Metformin today.\par \par -Random FS today: 74\par Dexcom in place.  I was able to print out the graphs.  She seems to have low values  after breakfast and occasionally over night.\par \par Historical values:\par  A1C 6.2%\par 2/3 ;\par  /234/208/114

## 2022-04-08 LAB
BILIRUB UR QL STRIP: NORMAL
CLARITY UR: CLEAR
COLLECTION METHOD: NORMAL
GLUCOSE BLDC GLUCOMTR-MCNC: 129
GLUCOSE UR-MCNC: NORMAL
HCG UR QL: 0.2 EU/DL
HGB UR QL STRIP.AUTO: NORMAL
KETONES UR-MCNC: NORMAL
LEUKOCYTE ESTERASE UR QL STRIP: NORMAL
NITRITE UR QL STRIP: NORMAL
PH UR STRIP: 6
PROT UR STRIP-MCNC: NORMAL
SP GR UR STRIP: 1.03

## 2022-04-12 DIAGNOSIS — O24.414 GESTATIONAL DIABETES MELLITUS IN PREGNANCY, INSULIN CONTROLLED: ICD-10-CM

## 2022-04-12 DIAGNOSIS — O34.219 MATERNAL CARE FOR UNSPECIFIED TYPE SCAR FROM PREVIOUS CESAREAN DELIVERY: ICD-10-CM

## 2022-04-12 DIAGNOSIS — Z3A.33 33 WEEKS GESTATION OF PREGNANCY: ICD-10-CM

## 2022-04-13 ENCOUNTER — NON-APPOINTMENT (OUTPATIENT)
Age: 37
End: 2022-04-13

## 2022-04-14 ENCOUNTER — APPOINTMENT (OUTPATIENT)
Dept: ANTEPARTUM | Facility: CLINIC | Age: 37
End: 2022-04-14
Payer: MEDICAID

## 2022-04-14 ENCOUNTER — OUTPATIENT (OUTPATIENT)
Dept: OUTPATIENT SERVICES | Facility: HOSPITAL | Age: 37
LOS: 1 days | Discharge: HOME | End: 2022-04-14

## 2022-04-14 ENCOUNTER — ASOB RESULT (OUTPATIENT)
Age: 37
End: 2022-04-14

## 2022-04-14 ENCOUNTER — RESULT CHARGE (OUTPATIENT)
Age: 37
End: 2022-04-14

## 2022-04-14 VITALS
SYSTOLIC BLOOD PRESSURE: 110 MMHG | OXYGEN SATURATION: 100 % | HEIGHT: 66 IN | BODY MASS INDEX: 35.84 KG/M2 | TEMPERATURE: 98.2 F | HEART RATE: 100 BPM | WEIGHT: 223 LBS | DIASTOLIC BLOOD PRESSURE: 69 MMHG

## 2022-04-14 DIAGNOSIS — O24.419 GESTATIONAL DIABETES MELLITUS IN PREGNANCY, UNSPECIFIED CONTROL: ICD-10-CM

## 2022-04-14 DIAGNOSIS — O34.219 MATERNAL CARE FOR UNSPECIFIED TYPE SCAR FROM PREVIOUS CESAREAN DELIVERY: ICD-10-CM

## 2022-04-14 LAB
BILIRUB UR QL STRIP: NORMAL
BP DIAS: 69 MM HG
BP SYS: 110 MM HG
CLARITY UR: CLEAR
COLLECTION METHOD: NORMAL
FETAL HEART RATE (BPM): 143
FETAL MOVEMENT: PRESENT
GLUCOSE BLDC GLUCOMTR-MCNC: 141
GLUCOSE UR-MCNC: NORMAL
HCG UR QL: 0.2 EU/DL
HGB UR QL STRIP.AUTO: NORMAL
KETONES UR-MCNC: NORMAL
LEUKOCYTE ESTERASE UR QL STRIP: NORMAL
NITRITE UR QL STRIP: NORMAL
OB COMMENTS: NORMAL
PH UR STRIP: 5
PROT UR STRIP-MCNC: NORMAL
SCHEDULED VISIT: YES
SP GR UR STRIP: 1.03
URINE ALBUMIN/PROTEIN: NORMAL
URINE GLUCOSE: NORMAL
URINE KETONES: NORMAL
WEEKS GESTATION: 34.5

## 2022-04-14 PROCEDURE — ZZZZZ: CPT

## 2022-04-14 PROCEDURE — 99213 OFFICE O/P EST LOW 20 MIN: CPT | Mod: 25

## 2022-04-14 PROCEDURE — 76818 FETAL BIOPHYS PROFILE W/NST: CPT | Mod: 26

## 2022-04-14 RX ORDER — BLOOD-GLUCOSE TRANSMITTER
EACH MISCELLANEOUS
Qty: 1 | Refills: 0 | Status: ACTIVE | COMMUNITY
Start: 2022-03-17 | End: 1900-01-01

## 2022-04-14 RX ORDER — BLOOD-GLUCOSE SENSOR
EACH MISCELLANEOUS
Qty: 9 | Refills: 2 | Status: ACTIVE | COMMUNITY
Start: 2022-03-17 | End: 1900-01-01

## 2022-04-14 RX ORDER — BLOOD-GLUCOSE,RECEIVER,CONT
EACH MISCELLANEOUS
Qty: 1 | Refills: 2 | Status: ACTIVE | COMMUNITY
Start: 2022-03-17 | End: 1900-01-01

## 2022-04-14 NOTE — OB HISTORY
[FreeTextEntry1] : Past Pregnancies: \par #1 (14):  delivery of unknown girl, at 39 weeks GA . patient received anesthesia. Delivery occurred at. ([place]) . no pregnancy complications reported. Pregnancy #1 Comments: (Primary  for breech presentation). \par #2 (18):  delivery of unknown boy, at 39 weeks GA . patient received anesthesia. Delivery occurred at. ([place]) . no pregnancy complications reported. Pregnancy #2 Comments: (Category 2 tracing, failed TOLAC). \par \par Current Pregnancy: LIBERTY: 2022

## 2022-04-14 NOTE — HISTORY OF PRESENT ILLNESS
[FreeTextEntry1] : 38yo  @34w5d by LIBERTY 22 by first trimester sonogram, presents for follow-up for GDMA2, referred by and co-managed with Dr. Porter\par \par Patient feels well. Denies contractions, vaginal bleeding, leakage of fluid. Reports good fetal movement. \par Denies fever, chills, nausea, vomiting, chest pain, shortness of breath, dysuria, abnormal discharge, leg swelling. She is feeling more energized compared to last visit.\par \par Patient is currently on NPH 4U in AM/ 14U at bedtime, Lispro 4 units with dinner. Metformin was discontinued 1 week ago.\par \par -Random FS today: 141 (2hrs postprandial)\par Dexcom in place.\par \par Historical values:\par  A1C 6.2%\par 2/3 ;\par  /234/208/114

## 2022-04-14 NOTE — PHYSICAL EXAM
[FreeTextEntry1] : General: In no apparent distress. \par \par HEENT: Atraumatic. Extraocular muscles intact. \par \par Cardiovascular: Regular rate and rhythm, normal S1/S2 \par \par Pulmonary: Clear to auscultation bilaterally. No wheezing. \par \par Abdomen: soft, gravid, nontender, nondistended, no rebound, no guarding \par \par Extremities: no calf tenderness or edema \par \par Neurology: +2 reflexes in bilateral upper extremities \par \par Psychiatry: Happy mood. Awake, alert, oriented to person, place, time. \par

## 2022-04-14 NOTE — END OF VISIT
[] : Resident [FreeTextEntry3] : Doing well. No complaints.\par GDM. Dexcom reviewed. Add 4 U Lispro with breakfast and increase to 6U with dinner.\par Follow weekly  testing.

## 2022-04-14 NOTE — DISCUSSION/SUMMARY
[FreeTextEntry1] : 36yo  @34w5d by LIBERTY 22 by first trimester sonogram, presents for follow-up for GDMA2, referred by and co-managed with Dr. Porter\par \par #GDMA2 \par - Dexcom in place. Pattern shows high values with breakfast. Average 116mg/dL, 79% in range, 20% high, 1% low.\par CURRENT REGIMEN: NPH 4 units with breakfast, 14 units at bedtime. 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 Porter\par -AFP low risk; NIPT low risk/ CF low risk\par -anatomy sono normal\par -Last EFW at 33w5d 2257g (42%)\par -Fetal movement and labor precautions discussed.\par -Weekly biophysical profiles with non stress tests\par -Delivery recommended by 39weeks\par \par RTC 1 week \par \par

## 2022-04-15 LAB
BASOPHILS # BLD AUTO: 0.04 K/UL
BASOPHILS NFR BLD AUTO: 0.6 %
EOSINOPHIL # BLD AUTO: 0.1 K/UL
EOSINOPHIL NFR BLD AUTO: 1.4 %
FERRITIN SERPL-MCNC: 100 NG/ML
FOLATE SERPL-MCNC: 10 NG/ML
HCT VFR BLD CALC: 34.7 %
HGB BLD-MCNC: 10.5 G/DL
IMM GRANULOCYTES NFR BLD AUTO: 1.4 %
IRON SATN MFR SERPL: 17 %
IRON SERPL-MCNC: 70 UG/DL
LYMPHOCYTES # BLD AUTO: 2.11 K/UL
LYMPHOCYTES NFR BLD AUTO: 30.3 %
MAN DIFF?: NORMAL
MCHC RBC-ENTMCNC: 19.8 PG
MCHC RBC-ENTMCNC: 30.3 G/DL
MCV RBC AUTO: 65.5 FL
MONOCYTES # BLD AUTO: 0.46 K/UL
MONOCYTES NFR BLD AUTO: 6.6 %
NEUTROPHILS # BLD AUTO: 4.15 K/UL
NEUTROPHILS NFR BLD AUTO: 59.7 %
PLATELET # BLD AUTO: 258 K/UL
RBC # BLD: 5.3 M/UL
RBC # FLD: 16.8 %
TIBC SERPL-MCNC: 423 UG/DL
UIBC SERPL-MCNC: 353 UG/DL
VIT B12 SERPL-MCNC: 306 PG/ML
WBC # FLD AUTO: 6.96 K/UL

## 2022-04-20 ENCOUNTER — NON-APPOINTMENT (OUTPATIENT)
Age: 37
End: 2022-04-20

## 2022-04-21 ENCOUNTER — ASOB RESULT (OUTPATIENT)
Age: 37
End: 2022-04-21

## 2022-04-21 ENCOUNTER — APPOINTMENT (OUTPATIENT)
Dept: ANTEPARTUM | Facility: CLINIC | Age: 37
End: 2022-04-21
Payer: MEDICAID

## 2022-04-21 ENCOUNTER — NON-APPOINTMENT (OUTPATIENT)
Age: 37
End: 2022-04-21

## 2022-04-21 ENCOUNTER — RESULT CHARGE (OUTPATIENT)
Age: 37
End: 2022-04-21

## 2022-04-21 ENCOUNTER — OUTPATIENT (OUTPATIENT)
Dept: OUTPATIENT SERVICES | Facility: HOSPITAL | Age: 37
LOS: 1 days | Discharge: HOME | End: 2022-04-21

## 2022-04-21 VITALS
TEMPERATURE: 98.2 F | HEART RATE: 87 BPM | SYSTOLIC BLOOD PRESSURE: 103 MMHG | DIASTOLIC BLOOD PRESSURE: 61 MMHG | OXYGEN SATURATION: 98 % | BODY MASS INDEX: 36.16 KG/M2 | HEIGHT: 66 IN | WEIGHT: 225 LBS

## 2022-04-21 PROCEDURE — ZZZZZ: CPT

## 2022-04-21 PROCEDURE — 76818 FETAL BIOPHYS PROFILE W/NST: CPT | Mod: 26

## 2022-04-21 PROCEDURE — 99214 OFFICE O/P EST MOD 30 MIN: CPT | Mod: 25

## 2022-04-21 NOTE — OB HISTORY
[FreeTextEntry1] : Past Pregnancies: \par #1 (14):  delivery of unknown girl, at 39 weeks GA . patient received anesthesia. Delivery occurred at. ([place]) . no pregnancy complications reported. Pregnancy #1 Comments: (Primary  for breech presentation). \par #2 (18):  delivery of unknown boy, at 39 weeks GA . patient received anesthesia. Delivery occurred at. ([place]) . no pregnancy complications reported. Pregnancy #2 Comments: (Category 2 tracing, failed TOLAC). \par \par Current Pregnancy: LIBERTY: 2022

## 2022-04-21 NOTE — HISTORY OF PRESENT ILLNESS
[FreeTextEntry1] : 38yo  @35w5d by LIBERTY 22 by first trimester sonogram, presents for follow-up for GDMA2, referred by and co-managed with Dr. Porter\par \par Patient feels well. Denies contractions, vaginal bleeding, leakage of fluid. Reports good fetal movement. \par Denies fever, chills, nausea, vomiting, chest pain, shortness of breath, dysuria, abnormal discharge, leg swelling.\par \par Patient is currently on  NPH 4U in AM/14U QHS; Lispro 4//6. Metformin was discontinued 2 weeks ago.\par \par Dexcom in place. Avg 121mg/dL, 77% in range, <1% in low range, 22% in elevated range. Most with meals.\par Patient is not taking NPH unless she eats in AM-- instructed to take basal no matter what. Advised to always have a snack.\par \par Historical values:\par  A1C 6.2%\par 2/3 ;\par  /234/208/114

## 2022-04-21 NOTE — DISCUSSION/SUMMARY
[FreeTextEntry1] : 36yo  @35w5d by LIBERTY 22 by first trimester sonogram, presents for follow-up for GDMA2, referred by and co-managed with Dr. Porter\par \par #GDMA2 \par - Dexcom in place. Pattern shows high values with breakfast. 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 Porter\par -AFP low risk; NIPT low risk/ CF low risk\par -anatomy sono normal\par -Last EFW at 33w5d 2257g (42%)\par -Fetal movement and labor precautions discussed.\par -Weekly biophysical profiles with non stress tests\par -Dr. Porter would like to schedule repeat  on  (39w3d), this plan is reasonable\par \par RTC 1 week \par

## 2022-04-22 LAB
BILIRUB UR QL STRIP: NORMAL
BP DIAS: 61 MM HG
BP SYS: 103 MM HG
CLARITY UR: CLEAR
COLLECTION METHOD: NORMAL
FETAL HEART RATE (BPM): 140
FETAL MOVEMENT: PRESENT
GLUCOSE UR-MCNC: NORMAL
HCG UR QL: 0.2 EU/DL
HGB UR QL STRIP.AUTO: NORMAL
KETONES UR-MCNC: NORMAL
LEUKOCYTE ESTERASE UR QL STRIP: NORMAL
NITRITE UR QL STRIP: NORMAL
OB COMMENTS: NORMAL
PH UR STRIP: 6
PROT UR STRIP-MCNC: NORMAL
SCHEDULED VISIT: YES
SP GR UR STRIP: 1.01
URINE ALBUMIN/PROTEIN: NORMAL
URINE GLUCOSE: NORMAL
URINE KETONES: NORMAL
WEEKS GESTATION: 35.5

## 2022-04-25 DIAGNOSIS — O09.523 SUPERVISION OF ELDERLY MULTIGRAVIDA, THIRD TRIMESTER: ICD-10-CM

## 2022-04-25 DIAGNOSIS — O24.414 GESTATIONAL DIABETES MELLITUS IN PREGNANCY, INSULIN CONTROLLED: ICD-10-CM

## 2022-04-25 DIAGNOSIS — O34.219 MATERNAL CARE FOR UNSPECIFIED TYPE SCAR FROM PREVIOUS CESAREAN DELIVERY: ICD-10-CM

## 2022-04-27 ENCOUNTER — NON-APPOINTMENT (OUTPATIENT)
Age: 37
End: 2022-04-27

## 2022-04-28 ENCOUNTER — RESULT CHARGE (OUTPATIENT)
Age: 37
End: 2022-04-28

## 2022-04-28 ENCOUNTER — APPOINTMENT (OUTPATIENT)
Dept: ANTEPARTUM | Facility: CLINIC | Age: 37
End: 2022-04-28
Payer: MEDICAID

## 2022-04-28 ENCOUNTER — APPOINTMENT (OUTPATIENT)
Dept: OBGYN | Facility: CLINIC | Age: 37
End: 2022-04-28
Payer: MEDICAID

## 2022-04-28 ENCOUNTER — OUTPATIENT (OUTPATIENT)
Dept: OUTPATIENT SERVICES | Facility: HOSPITAL | Age: 37
LOS: 1 days | Discharge: HOME | End: 2022-04-28

## 2022-04-28 ENCOUNTER — ASOB RESULT (OUTPATIENT)
Age: 37
End: 2022-04-28

## 2022-04-28 VITALS
WEIGHT: 225 LBS | BODY MASS INDEX: 36.16 KG/M2 | DIASTOLIC BLOOD PRESSURE: 68 MMHG | SYSTOLIC BLOOD PRESSURE: 118 MMHG | HEIGHT: 66 IN

## 2022-04-28 DIAGNOSIS — D56.3 THALASSEMIA MINOR: ICD-10-CM

## 2022-04-28 LAB
FETAL HEART DESCRIPTION: NORMAL
FETAL HEART RATE (BPM): 164
FETAL MOVEMENT: PRESENT
GLUCOSE BLDC GLUCOMTR-MCNC: 75 MG/DL — SIGNIFICANT CHANGE UP (ref 70–99)
OB COMMENTS: NORMAL
SCHEDULED VISIT: YES
URINE ALBUMIN/PROTEIN: NEGATIVE
URINE GLUCOSE: NEGATIVE
URINE KETONES: NEGATIVE
WEEKS GESTATION: 36.5

## 2022-04-28 PROCEDURE — 99213 OFFICE O/P EST LOW 20 MIN: CPT | Mod: 25

## 2022-04-28 PROCEDURE — ZZZZZ: CPT

## 2022-04-28 PROCEDURE — 76818 FETAL BIOPHYS PROFILE W/NST: CPT | Mod: 26

## 2022-04-28 PROCEDURE — 99213 OFFICE O/P EST LOW 20 MIN: CPT

## 2022-04-29 DIAGNOSIS — O09.523 SUPERVISION OF ELDERLY MULTIGRAVIDA, THIRD TRIMESTER: ICD-10-CM

## 2022-04-29 DIAGNOSIS — O24.414 GESTATIONAL DIABETES MELLITUS IN PREGNANCY, INSULIN CONTROLLED: ICD-10-CM

## 2022-04-29 DIAGNOSIS — O99.012 ANEMIA COMPLICATING PREGNANCY, SECOND TRIMESTER: ICD-10-CM

## 2022-04-29 DIAGNOSIS — D56.3 THALASSEMIA MINOR: ICD-10-CM

## 2022-04-29 DIAGNOSIS — Z3A.36 36 WEEKS GESTATION OF PREGNANCY: ICD-10-CM

## 2022-05-04 ENCOUNTER — NON-APPOINTMENT (OUTPATIENT)
Age: 37
End: 2022-05-04

## 2022-05-05 ENCOUNTER — APPOINTMENT (OUTPATIENT)
Dept: ANTEPARTUM | Facility: CLINIC | Age: 37
End: 2022-05-05
Payer: MEDICAID

## 2022-05-05 ENCOUNTER — APPOINTMENT (OUTPATIENT)
Dept: OBGYN | Facility: CLINIC | Age: 37
End: 2022-05-05
Payer: MEDICAID

## 2022-05-05 ENCOUNTER — OUTPATIENT (OUTPATIENT)
Dept: OUTPATIENT SERVICES | Facility: HOSPITAL | Age: 37
LOS: 1 days | Discharge: HOME | End: 2022-05-05

## 2022-05-05 ENCOUNTER — ASOB RESULT (OUTPATIENT)
Age: 37
End: 2022-05-05

## 2022-05-05 ENCOUNTER — RESULT CHARGE (OUTPATIENT)
Age: 37
End: 2022-05-05

## 2022-05-05 VITALS
SYSTOLIC BLOOD PRESSURE: 112 MMHG | BODY MASS INDEX: 36.34 KG/M2 | DIASTOLIC BLOOD PRESSURE: 68 MMHG | WEIGHT: 226.13 LBS | HEIGHT: 66 IN

## 2022-05-05 VITALS
DIASTOLIC BLOOD PRESSURE: 68 MMHG | BODY MASS INDEX: 37.93 KG/M2 | SYSTOLIC BLOOD PRESSURE: 117 MMHG | TEMPERATURE: 97.8 F | WEIGHT: 236 LBS | HEART RATE: 88 BPM | HEIGHT: 66 IN | OXYGEN SATURATION: 99 %

## 2022-05-05 VITALS — SYSTOLIC BLOOD PRESSURE: 117 MMHG | DIASTOLIC BLOOD PRESSURE: 68 MMHG

## 2022-05-05 DIAGNOSIS — Z34.90 ENCOUNTER FOR SUPERVISION OF NORMAL PREGNANCY, UNSPECIFIED, UNSPECIFIED TRIMESTER: ICD-10-CM

## 2022-05-05 DIAGNOSIS — O24.419 GESTATIONAL DIABETES MELLITUS IN PREGNANCY, UNSPECIFIED CONTROL: ICD-10-CM

## 2022-05-05 LAB
BILIRUB UR QL STRIP: NORMAL
BP DIAS: 68 MM HG
BP SYS: 117 MM HG
CLARITY UR: CLEAR
COLLECTION METHOD: NORMAL
FETAL HEART RATE (BPM): 148
FETAL MOVEMENT: PRESENT
GLUCOSE BLDC GLUCOMTR-MCNC: 118
GLUCOSE BLDC GLUCOMTR-MCNC: 118 MG/DL — HIGH (ref 70–99)
GLUCOSE UR-MCNC: NORMAL
HCG UR QL: 0.2 EU/DL
HGB UR QL STRIP.AUTO: NORMAL
KETONES UR-MCNC: NORMAL
LEUKOCYTE ESTERASE UR QL STRIP: NORMAL
NITRITE UR QL STRIP: NORMAL
OB COMMENTS: NORMAL
PH UR STRIP: 6
PROT UR STRIP-MCNC: NORMAL
SCHEDULED VISIT: YES
SP GR UR STRIP: 1.02
URINE ALBUMIN/PROTEIN: NORMAL
URINE GLUCOSE: NORMAL
URINE KETONES: NORMAL
WEEKS GESTATION: 37.5

## 2022-05-05 PROCEDURE — 99214 OFFICE O/P EST MOD 30 MIN: CPT

## 2022-05-05 PROCEDURE — ZZZZZ: CPT

## 2022-05-05 PROCEDURE — 99213 OFFICE O/P EST LOW 20 MIN: CPT | Mod: 25

## 2022-05-05 PROCEDURE — 76818 FETAL BIOPHYS PROFILE W/NST: CPT | Mod: 26

## 2022-05-05 PROCEDURE — 76816 OB US FOLLOW-UP PER FETUS: CPT | Mod: 26

## 2022-05-06 NOTE — OB HISTORY
[FreeTextEntry1] : #1 (14):  delivery of unknown girl, at 39 weeks GA . patient received anesthesia. Delivery occurred at. ([place]) . no pregnancy complications reported. Pregnancy #1 Comments: (Primary  for breech presentation). \par #2 (18):  delivery of unknown boy, at 39 weeks GA . patient received anesthesia. Delivery occurred at. ([place]) . no pregnancy complications reported. Pregnancy #2 Comments: (Category 2 tracing, failed TOLAC).

## 2022-05-06 NOTE — PHYSICAL EXAM
[FreeTextEntry1] : \par General: In no apparent distress. \par \par HEENT:  Atraumatic.  Extraocular muscles intact.  \par \par Cardiovascular: Regular rate and rhythm, normal S1/S2 \par \par Pulmonary: Clear to auscultation bilaterally.  No wheezing. \par \par Abdomen: soft, gravid, nontender, nondistended, no rebound, no guarding \par \par Extremities: no calf tenderness or edema \par \par Neurology: +2 reflexes in bilateral upper extremities \par \par Psychiatry:  Happy mood.  Awake, alert, oriented to person, place, time.

## 2022-05-06 NOTE — HISTORY OF PRESENT ILLNESS
[FreeTextEntry1] : 36yo  @37w5d by LIBERTY 22 by first trimester sonogram, presents for follow-up for GDMA2, co-managed with Dr. Porter\par \par Patient feels well. Denies contractions, vaginal bleeding, leakage of fluid. Reports good fetal movement. \par Denies fever, chills, nausea, vomiting, chest pain, shortness of breath, dysuria, abnormal discharge, leg swelling.\par \par Dexcom in place. Avg 118mg/dL, 80% in range, <1% in low range, 19% in elevated range.\par \par Some FS logs recorded:\par *fastin-106\par *2hr postprandial: 118-193 (all values elevated but one)\par \par CURRENT REGIMEN: NPH 9/15, lispro //\par \par Historical values:\par  A1C 6.2%\par 2/3 ;\par  /234/208/114 \par

## 2022-05-06 NOTE — DISCUSSION/SUMMARY
[FreeTextEntry1] : 38yo  @37w5d by LIBERTY 22 by first trimester sonogram, presents for follow-up for GDMA2, co-managed with Dr. Porter\par \par #GDMA2 \par - FS today 118 (2hr after eggs, bread and coffee)\par - Dexcom in place. Values reviewed. Needs calibration as well. Avg 118mg/dL, 80% in range, <1% in low range, 19% in elevated range.\par CURRENT REGIMEN: NPH 9U in AM/15U in PM, Lispro 5/x/7\par NEW REGIMEN: NPH: 10U AM/18U PM, lispro 7/x/7\par \par #Beta-thal minor\par - FOB is negative\par - s/p genetic counseling\par - last h/h 9.2/29.3, MCV 62.6\par - patient taking Iron tabs TID, Vit B12 daily\par \par #Pregnancy: Prenatal care is with Dr. Porter\par - AFP low risk; NIPT low risk; anatomy sono normal\par - Last EFW at 33w5d 2257g (42%)\par - Fetal movement and labor precautions discussed.\par - continue twice weekly BPP/NST \par - recommend delivery between 38w - 39w\par \par Continue twice weekly BPP/NST. RTC in 1 week if not delivered\par \par \par

## 2022-05-07 ENCOUNTER — LABORATORY RESULT (OUTPATIENT)
Age: 37
End: 2022-05-07

## 2022-05-10 ENCOUNTER — INPATIENT (INPATIENT)
Facility: HOSPITAL | Age: 37
LOS: 2 days | Discharge: HOME | End: 2022-05-13
Attending: STUDENT IN AN ORGANIZED HEALTH CARE EDUCATION/TRAINING PROGRAM | Admitting: STUDENT IN AN ORGANIZED HEALTH CARE EDUCATION/TRAINING PROGRAM
Payer: MEDICAID

## 2022-05-10 ENCOUNTER — RESULT REVIEW (OUTPATIENT)
Age: 37
End: 2022-05-10

## 2022-05-10 ENCOUNTER — TRANSCRIPTION ENCOUNTER (OUTPATIENT)
Age: 37
End: 2022-05-10

## 2022-05-10 DIAGNOSIS — Z98.890 OTHER SPECIFIED POSTPROCEDURAL STATES: Chronic | ICD-10-CM

## 2022-05-10 DIAGNOSIS — Z98.891 HISTORY OF UTERINE SCAR FROM PREVIOUS SURGERY: Chronic | ICD-10-CM

## 2022-05-10 LAB
ABO RH CONFIRMATION: SIGNIFICANT CHANGE UP
AMPHET UR-MCNC: NEGATIVE — SIGNIFICANT CHANGE UP
APPEARANCE UR: CLEAR — SIGNIFICANT CHANGE UP
BACTERIA # UR AUTO: NEGATIVE — SIGNIFICANT CHANGE UP
BARBITURATES UR SCN-MCNC: NEGATIVE — SIGNIFICANT CHANGE UP
BASOPHILS # BLD AUTO: 0.03 K/UL — SIGNIFICANT CHANGE UP (ref 0–0.2)
BASOPHILS NFR BLD AUTO: 0.5 % — SIGNIFICANT CHANGE UP (ref 0–1)
BENZODIAZ UR-MCNC: NEGATIVE — SIGNIFICANT CHANGE UP
BILIRUB UR-MCNC: NEGATIVE — SIGNIFICANT CHANGE UP
BLD GP AB SCN SERPL QL: SIGNIFICANT CHANGE UP
BUPRENORPHINE SCREEN, URINE RESULT: NEGATIVE — SIGNIFICANT CHANGE UP
COCAINE METAB.OTHER UR-MCNC: NEGATIVE — SIGNIFICANT CHANGE UP
COLOR SPEC: SIGNIFICANT CHANGE UP
DIFF PNL FLD: NEGATIVE — SIGNIFICANT CHANGE UP
EOSINOPHIL # BLD AUTO: 0.08 K/UL — SIGNIFICANT CHANGE UP (ref 0–0.7)
EOSINOPHIL NFR BLD AUTO: 1.3 % — SIGNIFICANT CHANGE UP (ref 0–8)
EPI CELLS # UR: 3 /HPF — SIGNIFICANT CHANGE UP (ref 0–5)
FENTANYL UR QL: NEGATIVE — SIGNIFICANT CHANGE UP
GLUCOSE BLDC GLUCOMTR-MCNC: 118 MG/DL — HIGH (ref 70–99)
GLUCOSE BLDC GLUCOMTR-MCNC: 78 MG/DL — SIGNIFICANT CHANGE UP (ref 70–99)
GLUCOSE BLDC GLUCOMTR-MCNC: 94 MG/DL — SIGNIFICANT CHANGE UP (ref 70–99)
GLUCOSE UR QL: NEGATIVE — SIGNIFICANT CHANGE UP
HCT VFR BLD CALC: 35 % — LOW (ref 37–47)
HGB BLD-MCNC: 11.3 G/DL — LOW (ref 12–16)
HIV 1 & 2 AB SERPL IA.RAPID: SIGNIFICANT CHANGE UP
HYALINE CASTS # UR AUTO: 5 /LPF — SIGNIFICANT CHANGE UP (ref 0–7)
IMM GRANULOCYTES NFR BLD AUTO: 0.8 % — HIGH (ref 0.1–0.3)
KETONES UR-MCNC: NEGATIVE — SIGNIFICANT CHANGE UP
L&D DRUG SCREEN, URINE: SIGNIFICANT CHANGE UP
LEUKOCYTE ESTERASE UR-ACNC: ABNORMAL
LYMPHOCYTES # BLD AUTO: 1.78 K/UL — SIGNIFICANT CHANGE UP (ref 1.2–3.4)
LYMPHOCYTES # BLD AUTO: 29.9 % — SIGNIFICANT CHANGE UP (ref 20.5–51.1)
MCHC RBC-ENTMCNC: 19.6 PG — LOW (ref 27–31)
MCHC RBC-ENTMCNC: 32.3 G/DL — SIGNIFICANT CHANGE UP (ref 32–37)
MCV RBC AUTO: 60.8 FL — LOW (ref 81–99)
METHADONE UR-MCNC: NEGATIVE — SIGNIFICANT CHANGE UP
MONOCYTES # BLD AUTO: 0.35 K/UL — SIGNIFICANT CHANGE UP (ref 0.1–0.6)
MONOCYTES NFR BLD AUTO: 5.9 % — SIGNIFICANT CHANGE UP (ref 1.7–9.3)
NEUTROPHILS # BLD AUTO: 3.66 K/UL — SIGNIFICANT CHANGE UP (ref 1.4–6.5)
NEUTROPHILS NFR BLD AUTO: 61.6 % — SIGNIFICANT CHANGE UP (ref 42.2–75.2)
NITRITE UR-MCNC: NEGATIVE — SIGNIFICANT CHANGE UP
NRBC # BLD: 0 /100 WBCS — SIGNIFICANT CHANGE UP (ref 0–0)
OPIATES UR-MCNC: NEGATIVE — SIGNIFICANT CHANGE UP
OXYCODONE UR-MCNC: NEGATIVE — SIGNIFICANT CHANGE UP
PCP UR-MCNC: NEGATIVE — SIGNIFICANT CHANGE UP
PH UR: 6 — SIGNIFICANT CHANGE UP (ref 5–8)
PLATELET # BLD AUTO: 278 K/UL — SIGNIFICANT CHANGE UP (ref 130–400)
PRENATAL SYPHILIS TEST: SIGNIFICANT CHANGE UP
PROPOXYPHENE QUALITATIVE URINE RESULT: NEGATIVE — SIGNIFICANT CHANGE UP
PROT UR-MCNC: NEGATIVE — SIGNIFICANT CHANGE UP
RBC # BLD: 5.76 M/UL — HIGH (ref 4.2–5.4)
RBC # FLD: 16 % — HIGH (ref 11.5–14.5)
RBC CASTS # UR COMP ASSIST: 4 /HPF — SIGNIFICANT CHANGE UP (ref 0–4)
SP GR SPEC: 1.01 — SIGNIFICANT CHANGE UP (ref 1.01–1.03)
UROBILINOGEN FLD QL: SIGNIFICANT CHANGE UP
WBC # BLD: 5.95 K/UL — SIGNIFICANT CHANGE UP (ref 4.8–10.8)
WBC # FLD AUTO: 5.95 K/UL — SIGNIFICANT CHANGE UP (ref 4.8–10.8)
WBC UR QL: 8 /HPF — HIGH (ref 0–5)

## 2022-05-10 PROCEDURE — 59514 CESAREAN DELIVERY ONLY: CPT | Mod: U7

## 2022-05-10 PROCEDURE — 88307 TISSUE EXAM BY PATHOLOGIST: CPT | Mod: 26

## 2022-05-10 RX ORDER — DEXTROSE 50 % IN WATER 50 %
25 SYRINGE (ML) INTRAVENOUS ONCE
Refills: 0 | Status: DISCONTINUED | OUTPATIENT
Start: 2022-05-10 | End: 2022-05-13

## 2022-05-10 RX ORDER — SENNA PLUS 8.6 MG/1
2 TABLET ORAL
Refills: 0 | Status: DISCONTINUED | OUTPATIENT
Start: 2022-05-10 | End: 2022-05-13

## 2022-05-10 RX ORDER — FAMOTIDINE 10 MG/ML
20 INJECTION INTRAVENOUS ONCE
Refills: 0 | Status: COMPLETED | OUTPATIENT
Start: 2022-05-10 | End: 2022-05-10

## 2022-05-10 RX ORDER — NALOXONE HYDROCHLORIDE 4 MG/.1ML
0.1 SPRAY NASAL
Refills: 0 | Status: DISCONTINUED | OUTPATIENT
Start: 2022-05-10 | End: 2022-05-13

## 2022-05-10 RX ORDER — ONDANSETRON 8 MG/1
4 TABLET, FILM COATED ORAL EVERY 6 HOURS
Refills: 0 | Status: DISCONTINUED | OUTPATIENT
Start: 2022-05-10 | End: 2022-05-13

## 2022-05-10 RX ORDER — MORPHINE SULFATE 50 MG/1
2 CAPSULE, EXTENDED RELEASE ORAL ONCE
Refills: 0 | Status: DISCONTINUED | OUTPATIENT
Start: 2022-05-10 | End: 2022-05-10

## 2022-05-10 RX ORDER — CEFAZOLIN SODIUM 1 G
2000 VIAL (EA) INJECTION ONCE
Refills: 0 | Status: COMPLETED | OUTPATIENT
Start: 2022-05-10 | End: 2022-05-10

## 2022-05-10 RX ORDER — SODIUM CHLORIDE 9 MG/ML
1000 INJECTION, SOLUTION INTRAVENOUS
Refills: 0 | Status: DISCONTINUED | OUTPATIENT
Start: 2022-05-10 | End: 2022-05-10

## 2022-05-10 RX ORDER — DIPHENHYDRAMINE HCL 50 MG
25 CAPSULE ORAL EVERY 6 HOURS
Refills: 0 | Status: DISCONTINUED | OUTPATIENT
Start: 2022-05-10 | End: 2022-05-13

## 2022-05-10 RX ORDER — OXYCODONE HYDROCHLORIDE 5 MG/1
5 TABLET ORAL
Refills: 0 | Status: COMPLETED | OUTPATIENT
Start: 2022-05-10 | End: 2022-05-17

## 2022-05-10 RX ORDER — DEXTROSE 50 % IN WATER 50 %
15 SYRINGE (ML) INTRAVENOUS ONCE
Refills: 0 | Status: DISCONTINUED | OUTPATIENT
Start: 2022-05-10 | End: 2022-05-13

## 2022-05-10 RX ORDER — INSULIN LISPRO 100/ML
VIAL (ML) SUBCUTANEOUS
Refills: 0 | Status: DISCONTINUED | OUTPATIENT
Start: 2022-05-10 | End: 2022-05-13

## 2022-05-10 RX ORDER — GLUCAGON INJECTION, SOLUTION 0.5 MG/.1ML
1 INJECTION, SOLUTION SUBCUTANEOUS ONCE
Refills: 0 | Status: DISCONTINUED | OUTPATIENT
Start: 2022-05-10 | End: 2022-05-13

## 2022-05-10 RX ORDER — SODIUM CHLORIDE 9 MG/ML
1000 INJECTION, SOLUTION INTRAVENOUS ONCE
Refills: 0 | Status: COMPLETED | OUTPATIENT
Start: 2022-05-10 | End: 2022-05-10

## 2022-05-10 RX ORDER — CITRIC ACID/SODIUM CITRATE 300-500 MG
30 SOLUTION, ORAL ORAL ONCE
Refills: 0 | Status: COMPLETED | OUTPATIENT
Start: 2022-05-10 | End: 2022-05-10

## 2022-05-10 RX ORDER — DEXTROSE 50 % IN WATER 50 %
12.5 SYRINGE (ML) INTRAVENOUS ONCE
Refills: 0 | Status: DISCONTINUED | OUTPATIENT
Start: 2022-05-10 | End: 2022-05-13

## 2022-05-10 RX ORDER — SODIUM CHLORIDE 9 MG/ML
1000 INJECTION, SOLUTION INTRAVENOUS
Refills: 0 | Status: DISCONTINUED | OUTPATIENT
Start: 2022-05-10 | End: 2022-05-13

## 2022-05-10 RX ORDER — OXYCODONE HYDROCHLORIDE 5 MG/1
5 TABLET ORAL ONCE
Refills: 0 | Status: DISCONTINUED | OUTPATIENT
Start: 2022-05-10 | End: 2022-05-13

## 2022-05-10 RX ORDER — OXYTOCIN 10 UNIT/ML
333.33 VIAL (ML) INJECTION
Qty: 20 | Refills: 0 | Status: DISCONTINUED | OUTPATIENT
Start: 2022-05-10 | End: 2022-05-13

## 2022-05-10 RX ORDER — SIMETHICONE 80 MG/1
80 TABLET, CHEWABLE ORAL EVERY 4 HOURS
Refills: 0 | Status: DISCONTINUED | OUTPATIENT
Start: 2022-05-10 | End: 2022-05-13

## 2022-05-10 RX ORDER — SODIUM CHLORIDE 9 MG/ML
1000 INJECTION, SOLUTION INTRAVENOUS
Refills: 0 | Status: DISCONTINUED | OUTPATIENT
Start: 2022-05-10 | End: 2022-05-11

## 2022-05-10 RX ORDER — MAGNESIUM HYDROXIDE 400 MG/1
30 TABLET, CHEWABLE ORAL
Refills: 0 | Status: DISCONTINUED | OUTPATIENT
Start: 2022-05-10 | End: 2022-05-13

## 2022-05-10 RX ORDER — LANOLIN
1 OINTMENT (GRAM) TOPICAL EVERY 6 HOURS
Refills: 0 | Status: DISCONTINUED | OUTPATIENT
Start: 2022-05-10 | End: 2022-05-13

## 2022-05-10 RX ORDER — OXYTOCIN 10 UNIT/ML
333.33 VIAL (ML) INJECTION
Qty: 20 | Refills: 0 | Status: DISCONTINUED | OUTPATIENT
Start: 2022-05-10 | End: 2022-05-10

## 2022-05-10 RX ORDER — TETANUS TOXOID, REDUCED DIPHTHERIA TOXOID AND ACELLULAR PERTUSSIS VACCINE, ADSORBED 5; 2.5; 8; 8; 2.5 [IU]/.5ML; [IU]/.5ML; UG/.5ML; UG/.5ML; UG/.5ML
0.5 SUSPENSION INTRAMUSCULAR ONCE
Refills: 0 | Status: DISCONTINUED | OUTPATIENT
Start: 2022-05-10 | End: 2022-05-13

## 2022-05-10 RX ORDER — KETOROLAC TROMETHAMINE 30 MG/ML
30 SYRINGE (ML) INJECTION EVERY 6 HOURS
Refills: 0 | Status: DISCONTINUED | OUTPATIENT
Start: 2022-05-10 | End: 2022-05-11

## 2022-05-10 RX ORDER — ACETAMINOPHEN 500 MG
975 TABLET ORAL
Refills: 0 | Status: DISCONTINUED | OUTPATIENT
Start: 2022-05-10 | End: 2022-05-13

## 2022-05-10 RX ORDER — ENOXAPARIN SODIUM 100 MG/ML
40 INJECTION SUBCUTANEOUS EVERY 24 HOURS
Refills: 0 | Status: DISCONTINUED | OUTPATIENT
Start: 2022-05-11 | End: 2022-05-13

## 2022-05-10 RX ORDER — DEXAMETHASONE 0.5 MG/5ML
4 ELIXIR ORAL EVERY 6 HOURS
Refills: 0 | Status: DISCONTINUED | OUTPATIENT
Start: 2022-05-10 | End: 2022-05-13

## 2022-05-10 RX ORDER — IBUPROFEN 200 MG
600 TABLET ORAL EVERY 6 HOURS
Refills: 0 | Status: COMPLETED | OUTPATIENT
Start: 2022-05-10 | End: 2023-04-08

## 2022-05-10 RX ADMIN — Medication 30 MILLILITER(S): at 14:49

## 2022-05-10 RX ADMIN — SODIUM CHLORIDE 125 MILLILITER(S): 9 INJECTION, SOLUTION INTRAVENOUS at 23:55

## 2022-05-10 RX ADMIN — SENNA PLUS 2 TABLET(S): 8.6 TABLET ORAL at 18:28

## 2022-05-10 RX ADMIN — Medication 30 MILLIGRAM(S): at 16:11

## 2022-05-10 RX ADMIN — SODIUM CHLORIDE 2000 MILLILITER(S): 9 INJECTION, SOLUTION INTRAVENOUS at 11:25

## 2022-05-10 RX ADMIN — Medication 100 MILLIGRAM(S): at 14:50

## 2022-05-10 RX ADMIN — Medication 30 MILLIGRAM(S): at 23:57

## 2022-05-10 RX ADMIN — FAMOTIDINE 20 MILLIGRAM(S): 10 INJECTION INTRAVENOUS at 14:49

## 2022-05-10 RX ADMIN — Medication 975 MILLIGRAM(S): at 23:45

## 2022-05-10 RX ADMIN — SIMETHICONE 80 MILLIGRAM(S): 80 TABLET, CHEWABLE ORAL at 23:18

## 2022-05-10 RX ADMIN — SIMETHICONE 80 MILLIGRAM(S): 80 TABLET, CHEWABLE ORAL at 18:28

## 2022-05-10 RX ADMIN — Medication 975 MILLIGRAM(S): at 22:39

## 2022-05-10 RX ADMIN — Medication 30 MILLIGRAM(S): at 22:04

## 2022-05-10 NOTE — OB PROVIDER DELIVERY SUMMARY - BABY A: DATE/TIME OF DELIVERY
10-May-2022 15:36
Principal Discharge DX:	Thigh laceration, left, subsequent encounter  Goal:	suture removal

## 2022-05-10 NOTE — OB RN INTRAOPERATIVE NOTE - NSSELHIDDEN_OBGYN_ALL_OB_FT
[NS_DeliveryAttending1_OBGYN_ALL_OB_FT:[email or web address]=],[NS_DeliveryAssist1_OBGYN_ALL_OB_FT:[id number]=],[NS_DeliveryRN_OBGYN_ALL_OB_FT:[id number]=]

## 2022-05-10 NOTE — OB RN PATIENT PROFILE - FALL HARM RISK - UNIVERSAL INTERVENTIONS
Bed in lowest position, wheels locked, appropriate side rails in place/Call bell, personal items and telephone in reach/Instruct patient to call for assistance before getting out of bed or chair/Non-slip footwear when patient is out of bed/[place] to call system/Physically safe environment - no spills, clutter or unnecessary equipment/Purposeful Proactive Rounding/Room/bathroom lighting operational, light cord in reach

## 2022-05-10 NOTE — OB PROVIDER H&P - NSICDXPASTMEDICALHX_GEN_ALL_CORE_FT
PAST MEDICAL HISTORY:  Fibroadenoma     Gestational diabetes requiring insulin     H/O beta thalassemia     Obesity

## 2022-05-10 NOTE — OB PROVIDER H&P - ASSESSMENT
A/P: 38yo  at 38w3d GA, Rh pos, h/o beta thalassemia on ferrous sulfate supplementation, advanced maternal age, GDMA2 on insulin with suboptimal control; scheduled repeat C/S  -discussed risks, benefits, and alternatives of    -dicussed the risks of bleeding, infection, injury to surrounding organs and structures, and potential need for a blood transfusion   -admit to L&D  -NPO   -IVF hydration   -ancef   -gomez   -skin prep   -pepcid, bicitra, reglan per routine  -peds notified of admission  -on call to OR  -anesthesia notified of admission  -3rd trimester labs     Dr. Porter aware

## 2022-05-10 NOTE — CHART NOTE - NSCHARTNOTEFT_GEN_A_CORE
PACU ANESTHESIA ADMISSION NOTE      Procedure: Repeat low transverse  section      Post op diagnosis:  Beta thalassemia    Multigravida of advanced maternal age    Gestational diabetes mellitus, class A2    Pregnancy with 38 completed weeks gestation        ____  Intubated  TV:______       Rate: ______      FiO2: ______    _x___  Patent Airway    _x___  Full return of protective reflexes    _x___  Full recovery from anesthesia / back to baseline status    Vitals:    See anesthesia record      Mental Status:  _x___ Awake   _____ Alert   _____ Drowsy   _____ Sedated    Nausea/Vomiting:  _x___  NO       ______Yes,   See Post - Op Orders         Pain Scale (0-10):  __0___    Treatment: _x___ None    ____ See Post - Op/PCA Orders    Post - Operative Fluids:   __x__ Oral   ____ See Post - Op Orders    Plan: Discharge:   ____Home       __x___Floor     _____Critical Care    _____  Other:_________________    Comments:  No anesthesia issues or complications noted.  Discharge when criteria met.

## 2022-05-10 NOTE — OB PROVIDER DELIVERY SUMMARY - NSSELHIDDEN_OBGYN_ALL_OB_FT
[NS_DeliveryAttending1_OBGYN_ALL_OB_FT:[email or web address]=],[NS_DeliveryAssist1_OBGYN_ALL_OB_FT:[id number]=],[NS_DeliveryRN_OBGYN_ALL_OB_FT:[id number]=]

## 2022-05-10 NOTE — PRE-ANESTHESIA EVALUATION ADULT - NSANTHADDINFOFT_GEN_ALL_CORE
Spinal anesthesia planned, backup GA  Discussed risks of spinal anesthesia including block failure, PDPH, back pain, bleeding, infection and nerve injury.  Also discussed risks of GA, including dental injury and more serious complications including cardiac and pulmonary complications.  Patient expresses understanding with regard to risks of anesthesia and wishes to proceed.

## 2022-05-10 NOTE — BRIEF OPERATIVE NOTE - NSICDXBRIEFPOSTOP_GEN_ALL_CORE_FT
POST-OP DIAGNOSIS:  Gestational diabetes mellitus, class A2 10-May-2022 16:25:57  Megan Ibrahim  Multigravida of advanced maternal age 10-May-2022 16:25:54  Megan Ibrahim  Beta thalassemia 10-May-2022 16:25:30  Megan Ibrahim  Pregnancy with 38 completed weeks gestation 10-May-2022 16:26:08  Megan Ibrahim

## 2022-05-10 NOTE — OB PROVIDER H&P - NSHPPHYSICALEXAM_GEN_ALL_CORE
GA: AOX3, no apparent distress  Resp: CTAB  Cardio: RRR  Abd: soft, nontender, no palpable ctx, gravid  Extr: no erythema or pain to palpation bilaterally   SVE: Deferred, no obstetrical complaints     EFM:  [place]: irregular   BSS: cephalic, Vital Signs Last 24 Hrs  T(C): 36.9 (10 May 2022 11:22), Max: 36.9 (10 May 2022 11:22)  T(F): 98.5 (10 May 2022 11:22), Max: 98.5 (10 May 2022 11:22)  HR: 86 (10 May 2022 11:47) (86 - 86)  BP: 115/74 (10 May 2022 11:47) (115/74 - 115/74)    Physical Exam:   GA: AOX3, no apparent distress  Resp: CTAB  Cardio: RRR  Abd: soft, nontender, no palpable ctx, gravid  Extr: no erythema or pain to palpation bilaterally   SVE: Deferred      EFM: 145bpm/moderate variability/+accels   [place]: irregular   BSS: cephalic, posterior placenta

## 2022-05-10 NOTE — OB RN DELIVERY SUMMARY - NSSELHIDDEN_OBGYN_ALL_OB_FT
[NS_DeliveryAttending1_OBGYN_ALL_OB_FT:[email or web address]=],[NS_DeliveryAssist1_OBGYN_ALL_OB_FT:[id number]=],[NS_DeliveryRN_OBGYN_ALL_OB_FT:[id number]=]

## 2022-05-10 NOTE — OB PROVIDER H&P - NS_OBGYNHISTORY_OBGYN_ALL_OB_FT
OB Hx: ) 2014, F, C/S for breech presentation   G2) 2018, M, failed TOLAC with category II FHR tracing   G3) current     Gyn Hx: Denies h/o abnormal pap, STI, ovarian cysts, or fibroids OB Hx: ) SAB, no D&C  G2) SAB, no D&C  G3) 2014, F, C/S for breech presentation   G4) SAB, no D&C  G5) 2018, M, failed TOLAC with category II FHR tracing   G6) current     Gyn Hx: Denies h/o abnormal pap, STI, ovarian cysts, or fibroids

## 2022-05-10 NOTE — OB RN PATIENT PROFILE - NS_OBGYNHISTORY_OBGYN_ALL_OB_FT
OB Hx: ) 2014, F, C/S for breech presentation   G2) 2018, M, failed TOLAC with category II FHR tracing   G3) current     Gyn Hx: Denies h/o abnormal pap, STI, ovarian cysts, or fibroids

## 2022-05-10 NOTE — BRIEF OPERATIVE NOTE - NSICDXBRIEFPREOP_GEN_ALL_CORE_FT
PRE-OP DIAGNOSIS:  38 weeks gestation of pregnancy 10-May-2022 16:24:46  Megan Ibrahim  Beta thalassemia 10-May-2022 16:25:26  Megna Ibrahim  Multigravida of advanced maternal age 10-May-2022 16:25:12  Megan Ibrahim  Gestational diabetes mellitus, class A2 10-May-2022 16:25:02  Megan Ibrahim

## 2022-05-10 NOTE — BRIEF OPERATIVE NOTE - OPERATION/FINDINGS
delivery of viable female infant in the cephalic position with APGARs 9/9. Bicornuate appearing uterus with normal appearing bilateral fallopian tube and ovaries

## 2022-05-10 NOTE — OB PROVIDER H&P - HISTORY OF PRESENT ILLNESS
36yo  at 38w3d GA dated by LMP c/w first tri sono presents to L&D for scheduled repeat C/S. Reports good fetal movement. Denies vaginal bleeding, leakage of fluid, or regular contractions.   Pregnancy complicated by:   #GDMA2 on insulin   -current regimen: NPH 10/18, lispro   -fasting POCT glucose:   -2hr PP POCT glucose: 118-193  -Utilizing a Dexcom. Avg 118mg/dL, 80% in range, <1% in low range, 19% in elevated range    #beta thalassemia minor   -s/p genetic counseling  -on supplemental ferrous sulfate TID and vitamin B12 daily   -last H/H 10.5/34.7 (2022)    #AMA  -s/p genetic counseling   -NIPTs low risk  36yo  at 38w3d GA dated by first tri sono presents to L&D for scheduled repeat C/S. Reports good fetal movement. Denies vaginal bleeding, leakage of fluid, or regular contractions. Last ate at 0700; two slices of bread, cheese, and milk.   Pregnancy complicated by:   #GDMA2 on insulin   -current regimen: NPH 10/18, lispro /  -fasting POCT glucose:   -2hr PP POCT glucose: 118-193  -Utilizing a Dexcom. Avg 118mg/dL, 80% in range, <1% in low range, 19% in elevated range    #beta thalassemia minor   -s/p genetic counseling  -on supplemental ferrous sulfate TID and vitamin B12 daily   -last H/H 10.5/34.7 (2022)    #AMA  -s/p genetic counseling   -NIPTs low risk

## 2022-05-10 NOTE — PRE-ANESTHESIA EVALUATION ADULT - NSANTHOBSERVEDRD_ENT_A_CORE
PA Note      Patient informed COVID PCR from 5/3/20 resulted positive.  Patient inquired when she can be discharged and informed her she is   being treated with IV abx until can be switched to PO as per ID.  Patient consented to having  be informed of positive Covid test results.        KALI Tuttle  Orthopedic Surgery  [phone] No

## 2022-05-10 NOTE — PROCEDURAL SAFETY CHECKLIST WITH OR WITHOUT SEDATION - NSBEDADDLTIME7_GEN_ALL_CORE
Prep Survey    Flowsheet Row Responses   [facility] facility patient discharged from? [facility]   Is LACE score < 7 ? No   Emergency Room discharge w/ pulse ox? No   Eligibility [facility]   Date of Admission 04/27/22   Date of Discharge 04/29/22   Discharge Disposition Home-Health Care Svc   Discharge diagnosis Hypertensive urgency   Does the patient have one of the following disease processes/diagnoses(primary or secondary)? Other   Does the patient have Home health ordered? Yes   What is the Home health agency?  [facility]   Is there a DME ordered? Yes   What DME was ordered? walker from [facility]   Prep survey completed? Yes          REBECCA SMYTH - Registered Nurse         yes

## 2022-05-10 NOTE — OB PROVIDER H&P - NSHPLABSRESULTS_GEN_ALL_CORE
Prenatal Labs:   NIPTs: low risk   HbSAg: neg   HIV: neg   RPR: neg  Blood Type: AB pos   Antibody Screen: neg  Rubella: immune  Mumps: nonimmune  Measles: immune   CF: neg         Ultrasounds:   @10w0d: CRL 2.9cm   @19w2d: EEFW 318g, MVP 4.55cm, no fetal anomalies noted   @29w5d: EFW 1489g (46%), posterior placenta, MVP 5.52cm  @33w5d: EFW 2257g (42%), MVP 5.4cm, BPP 10/10  @34w5d: breech, posterior placenta, MVP 8.04cm   @37w5d: EFW 3316g (63%), vtx, posterior placenta, MVP 6.64cm, BPP 8/8

## 2022-05-11 LAB
A1C WITH ESTIMATED AVERAGE GLUCOSE RESULT: 5.3 % — SIGNIFICANT CHANGE UP (ref 4–5.6)
BASOPHILS # BLD AUTO: 0.03 K/UL — SIGNIFICANT CHANGE UP (ref 0–0.2)
BASOPHILS # BLD AUTO: 0.03 K/UL — SIGNIFICANT CHANGE UP (ref 0–0.2)
BASOPHILS NFR BLD AUTO: 0.4 % — SIGNIFICANT CHANGE UP (ref 0–1)
BASOPHILS NFR BLD AUTO: 0.4 % — SIGNIFICANT CHANGE UP (ref 0–1)
COVID-19 SPIKE DOMAIN AB INTERP: POSITIVE
COVID-19 SPIKE DOMAIN ANTIBODY RESULT: >250 U/ML — HIGH
EOSINOPHIL # BLD AUTO: 0.06 K/UL — SIGNIFICANT CHANGE UP (ref 0–0.7)
EOSINOPHIL # BLD AUTO: 0.07 K/UL — SIGNIFICANT CHANGE UP (ref 0–0.7)
EOSINOPHIL NFR BLD AUTO: 0.7 % — SIGNIFICANT CHANGE UP (ref 0–8)
EOSINOPHIL NFR BLD AUTO: 0.9 % — SIGNIFICANT CHANGE UP (ref 0–8)
ESTIMATED AVERAGE GLUCOSE: 105 MG/DL — SIGNIFICANT CHANGE UP (ref 68–114)
GLUCOSE BLDC GLUCOMTR-MCNC: 112 MG/DL — HIGH (ref 70–99)
GLUCOSE BLDC GLUCOMTR-MCNC: 123 MG/DL — HIGH (ref 70–99)
GLUCOSE BLDC GLUCOMTR-MCNC: 86 MG/DL — SIGNIFICANT CHANGE UP (ref 70–99)
GLUCOSE BLDC GLUCOMTR-MCNC: 93 MG/DL — SIGNIFICANT CHANGE UP (ref 70–99)
GLUCOSE BLDC GLUCOMTR-MCNC: 96 MG/DL — SIGNIFICANT CHANGE UP (ref 70–99)
HCT VFR BLD CALC: 25.5 % — LOW (ref 37–47)
HCT VFR BLD CALC: 25.7 % — LOW (ref 37–47)
HGB BLD-MCNC: 8.1 G/DL — LOW (ref 12–16)
HGB BLD-MCNC: 8.2 G/DL — LOW (ref 12–16)
IMM GRANULOCYTES NFR BLD AUTO: 0.5 % — HIGH (ref 0.1–0.3)
IMM GRANULOCYTES NFR BLD AUTO: 0.6 % — HIGH (ref 0.1–0.3)
LYMPHOCYTES # BLD AUTO: 2.18 K/UL — SIGNIFICANT CHANGE UP (ref 1.2–3.4)
LYMPHOCYTES # BLD AUTO: 2.24 K/UL — SIGNIFICANT CHANGE UP (ref 1.2–3.4)
LYMPHOCYTES # BLD AUTO: 26.5 % — SIGNIFICANT CHANGE UP (ref 20.5–51.1)
LYMPHOCYTES # BLD AUTO: 28.2 % — SIGNIFICANT CHANGE UP (ref 20.5–51.1)
MCHC RBC-ENTMCNC: 19.8 PG — LOW (ref 27–31)
MCHC RBC-ENTMCNC: 19.9 PG — LOW (ref 27–31)
MCHC RBC-ENTMCNC: 31.5 G/DL — LOW (ref 32–37)
MCHC RBC-ENTMCNC: 32.2 G/DL — SIGNIFICANT CHANGE UP (ref 32–37)
MCV RBC AUTO: 61.6 FL — LOW (ref 81–99)
MCV RBC AUTO: 63 FL — LOW (ref 81–99)
MONOCYTES # BLD AUTO: 0.46 K/UL — SIGNIFICANT CHANGE UP (ref 0.1–0.6)
MONOCYTES # BLD AUTO: 0.58 K/UL — SIGNIFICANT CHANGE UP (ref 0.1–0.6)
MONOCYTES NFR BLD AUTO: 6 % — SIGNIFICANT CHANGE UP (ref 1.7–9.3)
MONOCYTES NFR BLD AUTO: 6.9 % — SIGNIFICANT CHANGE UP (ref 1.7–9.3)
NEUTROPHILS # BLD AUTO: 4.93 K/UL — SIGNIFICANT CHANGE UP (ref 1.4–6.5)
NEUTROPHILS # BLD AUTO: 5.49 K/UL — SIGNIFICANT CHANGE UP (ref 1.4–6.5)
NEUTROPHILS NFR BLD AUTO: 63.9 % — SIGNIFICANT CHANGE UP (ref 42.2–75.2)
NEUTROPHILS NFR BLD AUTO: 65 % — SIGNIFICANT CHANGE UP (ref 42.2–75.2)
NRBC # BLD: 0 /100 WBCS — SIGNIFICANT CHANGE UP (ref 0–0)
NRBC # BLD: 0 /100 WBCS — SIGNIFICANT CHANGE UP (ref 0–0)
PLATELET # BLD AUTO: 245 K/UL — SIGNIFICANT CHANGE UP (ref 130–400)
PLATELET # BLD AUTO: 271 K/UL — SIGNIFICANT CHANGE UP (ref 130–400)
RBC # BLD: 4.08 M/UL — LOW (ref 4.2–5.4)
RBC # BLD: 4.14 M/UL — LOW (ref 4.2–5.4)
RBC # FLD: 15.5 % — HIGH (ref 11.5–14.5)
RBC # FLD: 15.6 % — HIGH (ref 11.5–14.5)
SARS-COV-2 IGG+IGM SERPL QL IA: >250 U/ML — HIGH
SARS-COV-2 IGG+IGM SERPL QL IA: POSITIVE
WBC # BLD: 7.72 K/UL — SIGNIFICANT CHANGE UP (ref 4.8–10.8)
WBC # BLD: 8.44 K/UL — SIGNIFICANT CHANGE UP (ref 4.8–10.8)
WBC # FLD AUTO: 7.72 K/UL — SIGNIFICANT CHANGE UP (ref 4.8–10.8)
WBC # FLD AUTO: 8.44 K/UL — SIGNIFICANT CHANGE UP (ref 4.8–10.8)

## 2022-05-11 PROCEDURE — 99231 SBSQ HOSP IP/OBS SF/LOW 25: CPT

## 2022-05-11 RX ORDER — IBUPROFEN 200 MG
600 TABLET ORAL EVERY 6 HOURS
Refills: 0 | Status: DISCONTINUED | OUTPATIENT
Start: 2022-05-11 | End: 2022-05-13

## 2022-05-11 RX ORDER — IRON SUCROSE 20 MG/ML
200 INJECTION, SOLUTION INTRAVENOUS ONCE
Refills: 0 | Status: COMPLETED | OUTPATIENT
Start: 2022-05-11 | End: 2022-05-11

## 2022-05-11 RX ADMIN — Medication 30 MILLIGRAM(S): at 01:14

## 2022-05-11 RX ADMIN — Medication 600 MILLIGRAM(S): at 18:12

## 2022-05-11 RX ADMIN — IRON SUCROSE 110 MILLIGRAM(S): 20 INJECTION, SOLUTION INTRAVENOUS at 08:55

## 2022-05-11 RX ADMIN — Medication 30 MILLIGRAM(S): at 12:01

## 2022-05-11 RX ADMIN — SENNA PLUS 2 TABLET(S): 8.6 TABLET ORAL at 17:06

## 2022-05-11 RX ADMIN — Medication 975 MILLIGRAM(S): at 09:00

## 2022-05-11 RX ADMIN — Medication 975 MILLIGRAM(S): at 22:47

## 2022-05-11 RX ADMIN — SIMETHICONE 80 MILLIGRAM(S): 80 TABLET, CHEWABLE ORAL at 21:23

## 2022-05-11 RX ADMIN — Medication 1 TABLET(S): at 12:00

## 2022-05-11 RX ADMIN — Medication 975 MILLIGRAM(S): at 08:41

## 2022-05-11 RX ADMIN — Medication 975 MILLIGRAM(S): at 14:12

## 2022-05-11 RX ADMIN — SIMETHICONE 80 MILLIGRAM(S): 80 TABLET, CHEWABLE ORAL at 09:21

## 2022-05-11 RX ADMIN — SIMETHICONE 80 MILLIGRAM(S): 80 TABLET, CHEWABLE ORAL at 13:20

## 2022-05-11 RX ADMIN — Medication 975 MILLIGRAM(S): at 21:24

## 2022-05-11 RX ADMIN — SIMETHICONE 80 MILLIGRAM(S): 80 TABLET, CHEWABLE ORAL at 17:05

## 2022-05-11 RX ADMIN — Medication 30 MILLIGRAM(S): at 13:00

## 2022-05-11 RX ADMIN — Medication 30 MILLIGRAM(S): at 07:10

## 2022-05-11 RX ADMIN — Medication 30 MILLIGRAM(S): at 06:09

## 2022-05-11 RX ADMIN — Medication 600 MILLIGRAM(S): at 23:26

## 2022-05-11 RX ADMIN — Medication 600 MILLIGRAM(S): at 17:05

## 2022-05-11 RX ADMIN — SIMETHICONE 80 MILLIGRAM(S): 80 TABLET, CHEWABLE ORAL at 06:09

## 2022-05-11 RX ADMIN — Medication 975 MILLIGRAM(S): at 18:12

## 2022-05-11 RX ADMIN — ENOXAPARIN SODIUM 40 MILLIGRAM(S): 100 INJECTION SUBCUTANEOUS at 06:17

## 2022-05-11 RX ADMIN — SENNA PLUS 2 TABLET(S): 8.6 TABLET ORAL at 06:10

## 2022-05-11 NOTE — PROGRESS NOTE ADULT - ATTENDING COMMENTS
Patient feels well. I agree with findings as above. POD#1 with acute blood loss from surgery. Will repeat CBC in the evening. She appears clinically and hemodynamically well.

## 2022-05-11 NOTE — PROGRESS NOTE ADULT - ASSESSMENT
36yo P3 S/P repeat LTCS POD#0, , complicated with GDMA2 on NPH10/18, lispro 7/x/7 and beta thal, recovering well.    - encourage ambulation  - PO hydration   - Continue Diet as tolerated  - DVT ppx: SCDs and Lovenox   - FS ACqHS & sliding scale  - still crossed for 2U, bleeding well controlled, vitals wnl  - Incentive Spirometry bedside and encouraged   - f/u AM CBC   - Vital signs [id number]   - Day catheter in place until the am    Dr. Field and Dr. Saldivar to be made aware. 36yo P3 S/P repeat LTCS POD#1, , complicated with GDMA2, and beta thal, recovering well.    - encourage ambulation  - PO hydration   - Continue Diet as tolerated  - DVT ppx: SCDs and Lovenox   - FS ACqHS & sliding scale  - still crossed for 2U, bleeding well controlled, vitals wnl  - Incentive Spirometry bedside and encouraged   - f/u AM CBC   - Vital signs [id number]   - Simethicone and senna standing  - Day catheter in place until the am    Dr. Field and Dr. Saldivar  aware.

## 2022-05-11 NOTE — PROGRESS NOTE ADULT - SUBJECTIVE AND OBJECTIVE BOX
PGY 1 Note:    Chief Complaint: Post  section    HPI: Pt doing well, pain well controlled. No acute complaints. She is not yet ambulating or passing gas. She is tolerating regular diet without difficulty. She is breastfeeding without any issues. She has a gomez in place. Denies fevers, chills, SOB, CP, abdominal pain or heavy vaginal bleeding.    ROS: Denies cardiovascular or respiratory symptoms    PAST MEDICAL & SURGICAL HISTORY:  Fibroadenoma    Gestational diabetes requiring insulin    Obesity    H/O beta thalassemia    H/O lumpectomy    H/O  section    S/P ovarian cystectomy  left    Physical Exam  Vital Signs Last 24 Hrs  T(F): 96.8 (10 May 2022 23:15), Max: 98.5 (10 May 2022 11:22)  HR: 80 (10 May 2022 23:15) (71 - 94)  BP: 111/59 (10 May 2022 23:15) (100/56 - 117/57)  RR: 18 (10 May 2022 23:15) (18 - 18)    Physical exam:  General - AAOx3, NAD  Heart - S1S2, RRR  Lungs - CTA BL  Abdomen:  - Soft, appropriately tender, mildly distended, BS+. Clean, dry, intact dressing in place over pfannenstiel skin incision.  - Fundus firm, appropriately tender, below the umbilicus  Pelvis/Vagina - Normal Lochia  Extremities - No calf tenderness, no swelling    Labs:                        11.3   5.95  )-----------( 278      ( 10 May 2022 11:55 )             35.0     Antibody Screen: NEG (05-10-22 @ 12:01)    Prenatal Syphilis Test: Nonreact (05-10-22 @ 11:54)   PGY 1 Note:    Chief Complaint: Post  section    HPI: Pt doing well, pain well controlled. No acute complaints. She is not yet ambulating or passing gas. She is tolerating regular diet without difficulty. She is breastfeeding without any issues. She has a gomez in place. Denies fevers, chills, SOB, CP, abdominal pain or heavy vaginal bleeding.    ROS: Denies cardiovascular or respiratory symptoms    PAST MEDICAL & SURGICAL HISTORY:  Fibroadenoma    Gestational diabetes requiring insulin    Obesity    H/O beta thalassemia    H/O lumpectomy    H/O  section    S/P ovarian cystectomy  left    Physical Exam  Vital Signs Last 24 Hrs  T(F): 96.8 (10 May 2022 23:15), Max: 98.5 (10 May 2022 11:22)  HR: 80 (10 May 2022 23:15) (71 - 94)  BP: 111/59 (10 May 2022 23:15) (100/56 - 117/57)  RR: 18 (10 May 2022 23:15) (18 - 18)  Bedtime FS: 118mg/dL    Physical exam:  General - AAOx3, NAD  Heart - S1S2, RRR  Lungs - CTA BL  Abdomen:  - Soft, appropriately tender, mildly distended, BS+. Clean, dry, intact dressing in place over pfannenstiel skin incision.  - Fundus firm, appropriately tender, below the umbilicus  Pelvis/Vagina - Normal Lochia  Extremities - No calf tenderness, no swelling    Labs:                        11.3   5.95  )-----------( 278      ( 10 May 2022 11:55 )             35.0     Antibody Screen: NEG (05-10-22 @ 12:01)    Prenatal Syphilis Test: Nonreact (05-10-22 @ 11:54)    MEDICATIONS  (STANDING):  acetaminophen     Tablet .. 975 milliGRAM(s) Oral <User Schedule>  dextrose 5%. 1000 milliLiter(s) (50 mL/Hr) IV Continuous <Continuous>  dextrose 5%. 1000 milliLiter(s) (100 mL/Hr) IV Continuous <Continuous>  dextrose 50% Injectable 25 Gram(s) IV Push once  dextrose 50% Injectable 12.5 Gram(s) IV Push once  dextrose 50% Injectable 25 Gram(s) IV Push once  diphtheria/tetanus/pertussis (acellular) Vaccine (ADAcel) 0.5 milliLiter(s) IntraMuscular once  enoxaparin Injectable 40 milliGRAM(s) SubCutaneous every 24 hours  glucagon  Injectable 1 milliGRAM(s) IntraMuscular once  ibuprofen  Tablet. 600 milliGRAM(s) Oral every 6 hours  insulin lispro (ADMELOG) corrective regimen sliding scale   SubCutaneous three times a day before meals  ketorolac   Injectable 30 milliGRAM(s) IV Push every 6 hours  lactated ringers. 1000 milliLiter(s) (125 mL/Hr) IV Continuous <Continuous>  oxytocin Infusion 333.333 milliUNIT(s)/Min (1000 mL/Hr) IV Continuous <Continuous>  prenatal multivitamin 1 Tablet(s) Oral daily  senna 2 Tablet(s) Oral two times a day  simethicone 80 milliGRAM(s) Chew every 4 hours

## 2022-05-11 NOTE — PROGRESS NOTE ADULT - ASSESSMENT
A/P: 38yo now P3 POD#1 S/P repeat C/S (#3) with an EBL of 800cc, h/o beta thalassemia anemia s/p supplemental ferrous sulfate, h/o GDMA2 on insulin therapy; currently recovering well  POD  , recovering well   - encourage ambulation  - encourage PO hydration  - encourage incentive spirometry use  - monitor vitals, bleeding   - simethicone & senna   - DVT ppx: lovenox & SCDs  - diet: regular carb consistent   - indwelling urinary catheter removed. TOV by 1300  - continue IVF hydration until successful trial of void   - pain mgmt prn   - repeat 1600 CBC ordered  - IV venofer X1 dose ordered   - FS ACqHS with low dose sliding scale insulin as needed  - will evaluate for the need for insulin on discharge based upon sliding scale coverage needed while hospitalized    Discussed with Dr. Porter  A/P: 38yo now P3 POD#1 S/P repeat C/S (#3) with an EBL of 800cc, h/o beta thalassemia anemia s/p supplemental ferrous sulfate, h/o GDMA2 on insulin therapy; currently recovering well, recovering well   - encourage ambulation  - encourage PO hydration  - encourage incentive spirometry use  - monitor vitals, bleeding   - simethicone & senna   - DVT ppx: lovenox & SCDs  - diet: regular carb consistent   - indwelling urinary catheter removed. TOV by 1300  - continue IVF hydration until successful trial of void   - pain mgmt prn   - repeat 1600 CBC ordered  - IV venofer X1 dose ordered   - FS ACqHS with low dose sliding scale insulin as needed  - will evaluate for the need for insulin on discharge based upon sliding scale coverage needed while hospitalized    Discussed with Dr. Porter

## 2022-05-11 NOTE — PROGRESS NOTE ADULT - SUBJECTIVE AND OBJECTIVE BOX
JUANITA JONATHANBOBBYPAOLA  37y  Female    PGY3 Note:    POD#1  Pt is recovering well, no acute complaints. Pt denies headache, chest pain, SOB, fever, chills, nausea, vomiting, extremity pain or swelling. Reports difficulty with breastfeeding, infant able to latch but not producing colostrum.  Pt denies headaches, vision changes, palpitations or RUQ pain. Pt denies palpitations, shortness of breath, dizziness, or syncope.     Ambulating: No, OOB to chair. SCDs on   Voiding: No, indwelling urinary catheter in place  Flatus: No   Bowel movements: No   Breast or bottle feeding: Breastfeeding  Diet: Regular    MEDICATIONS  (STANDING):  acetaminophen     Tablet .. 975 milliGRAM(s) Oral <User Schedule>  dextrose 5%. 1000 milliLiter(s) (50 mL/Hr) IV Continuous <Continuous>  dextrose 5%. 1000 milliLiter(s) (100 mL/Hr) IV Continuous <Continuous>  dextrose 50% Injectable 25 Gram(s) IV Push once  dextrose 50% Injectable 12.5 Gram(s) IV Push once  dextrose 50% Injectable 25 Gram(s) IV Push once  diphtheria/tetanus/pertussis (acellular) Vaccine (ADAcel) 0.5 milliLiter(s) IntraMuscular once  enoxaparin Injectable 40 milliGRAM(s) SubCutaneous every 24 hours  glucagon  Injectable 1 milliGRAM(s) IntraMuscular once  ibuprofen  Tablet. 600 milliGRAM(s) Oral every 6 hours  insulin lispro (ADMELOG) corrective regimen sliding scale   SubCutaneous three times a day before meals  ketorolac   Injectable 30 milliGRAM(s) IV Push every 6 hours  lactated ringers. 1000 milliLiter(s) (125 mL/Hr) IV Continuous <Continuous>  oxytocin Infusion 333.333 milliUNIT(s)/Min (1000 mL/Hr) IV Continuous <Continuous>  prenatal multivitamin 1 Tablet(s) Oral daily  senna 2 Tablet(s) Oral two times a day  simethicone 80 milliGRAM(s) Chew every 4 hours    MEDICATIONS  (PRN):  dexAMETHasone  Injectable 4 milliGRAM(s) IV Push every 6 hours PRN Nausea  dextrose Oral Gel 15 Gram(s) Oral once PRN Blood Glucose LESS THAN 70 milliGRAM(s)/deciliter  diphenhydrAMINE 25 milliGRAM(s) Oral every 6 hours PRN Pruritus  lanolin Ointment 1 Application(s) Topical every 6 hours PRN Sore Nipples  magnesium hydroxide Suspension 30 milliLiter(s) Oral two times a day PRN Constipation  naloxone Injectable 0.1 milliGRAM(s) IV Push every 3 minutes PRN For ANY of the following changes in patient status:  A. Breaths Per Minute LESS THAN 10, B. Oxygen saturation LESS THAN 90%, C. Sedation score of 6 for Stop After: 4 Times  ondansetron Injectable 4 milliGRAM(s) IV Push every 6 hours PRN Nausea  oxyCODONE    IR 5 milliGRAM(s) Oral every 3 hours PRN Moderate to Severe Pain (4-10)  oxyCODONE    IR 5 milliGRAM(s) Oral once PRN Moderate to Severe Pain (4-10)      PAST MEDICAL & SURGICAL HISTORY:  Fibroadenoma  Gestational diabetes requiring insulin  Obesity  H/O beta thalassemia  H/O lumpectomy  H/O  section  S/P ovarian cystectomy  left          Physical Exam  Vital Signs Last 24 Hrs  T(C): 36.3 (11 May 2022 03:18), Max: 36.9 (10 May 2022 11:22)  T(F): 97.3 (11 May 2022 03:18), Max: 98.5 (10 May 2022 11:22)  HR: 71 (11 May 2022 03:18) (71 - 94)  BP: 96/51 (11 May 2022 03:18) (96/51 - 117/57)  RR: 18 (11 May 2022 03:18) (18 - 18)  SpO2: 99% (10 May 2022 20:24) (97% - 100%)    UO: 1000cc (from 0470-7306), adequate    Gen: AAOx3, NAD  Heart: RRR  Lungs: CTAB  Fundus: firm, below umbilicus   Wound: surgical dressing changed, steri stripes in place, pfannenstiel incision c/d/i with no surrounding erythema or edema    Abd: Soft, appropriately tender near incision site, mildly distended, BS+  Lochia: minimal rubra   Ext: No calf tenderness, no swelling    Labs:                        8.2    8.44  )-----------( 245      ( 11 May 2022 05:46 )             25.5                         11.3   5.95  )-----------( 278      ( 10 May 2022 11:55 )             35.0

## 2022-05-12 ENCOUNTER — APPOINTMENT (OUTPATIENT)
Dept: ANTEPARTUM | Facility: CLINIC | Age: 37
End: 2022-05-12

## 2022-05-12 ENCOUNTER — TRANSCRIPTION ENCOUNTER (OUTPATIENT)
Age: 37
End: 2022-05-12

## 2022-05-12 LAB
GLUCOSE BLDC GLUCOMTR-MCNC: 122 MG/DL — HIGH (ref 70–99)
GLUCOSE BLDC GLUCOMTR-MCNC: 83 MG/DL — SIGNIFICANT CHANGE UP (ref 70–99)
GLUCOSE BLDC GLUCOMTR-MCNC: 95 MG/DL — SIGNIFICANT CHANGE UP (ref 70–99)
GLUCOSE BLDC GLUCOMTR-MCNC: 95 MG/DL — SIGNIFICANT CHANGE UP (ref 70–99)

## 2022-05-12 PROCEDURE — 99238 HOSP IP/OBS DSCHRG MGMT 30/<: CPT

## 2022-05-12 RX ORDER — LANOLIN
1 OINTMENT (GRAM) TOPICAL
Qty: 28 | Refills: 0
Start: 2022-05-12 | End: 2022-05-18

## 2022-05-12 RX ORDER — OXYCODONE HYDROCHLORIDE 5 MG/1
1 TABLET ORAL
Qty: 12 | Refills: 0
Start: 2022-05-12 | End: 2022-05-14

## 2022-05-12 RX ORDER — OXYCODONE HYDROCHLORIDE 5 MG/1
5 TABLET ORAL ONCE
Refills: 0 | Status: DISCONTINUED | OUTPATIENT
Start: 2022-05-12 | End: 2022-05-12

## 2022-05-12 RX ORDER — SIMETHICONE 80 MG/1
1 TABLET, CHEWABLE ORAL
Qty: 42 | Refills: 0
Start: 2022-05-12 | End: 2022-05-18

## 2022-05-12 RX ORDER — IBUPROFEN 200 MG
1 TABLET ORAL
Qty: 28 | Refills: 0
Start: 2022-05-12 | End: 2022-05-18

## 2022-05-12 RX ORDER — OXYCODONE HYDROCHLORIDE 5 MG/1
5 TABLET ORAL
Refills: 0 | Status: DISCONTINUED | OUTPATIENT
Start: 2022-05-12 | End: 2022-05-13

## 2022-05-12 RX ORDER — ACETAMINOPHEN 500 MG
3 TABLET ORAL
Qty: 60 | Refills: 0
Start: 2022-05-12 | End: 2022-05-16

## 2022-05-12 RX ORDER — SENNA PLUS 8.6 MG/1
2 TABLET ORAL
Qty: 56 | Refills: 0
Start: 2022-05-12 | End: 2022-05-25

## 2022-05-12 RX ADMIN — SIMETHICONE 80 MILLIGRAM(S): 80 TABLET, CHEWABLE ORAL at 09:10

## 2022-05-12 RX ADMIN — Medication 1 TABLET(S): at 11:29

## 2022-05-12 RX ADMIN — Medication 600 MILLIGRAM(S): at 11:29

## 2022-05-12 RX ADMIN — Medication 600 MILLIGRAM(S): at 18:45

## 2022-05-12 RX ADMIN — Medication 975 MILLIGRAM(S): at 15:00

## 2022-05-12 RX ADMIN — Medication 600 MILLIGRAM(S): at 06:24

## 2022-05-12 RX ADMIN — Medication 975 MILLIGRAM(S): at 20:50

## 2022-05-12 RX ADMIN — Medication 600 MILLIGRAM(S): at 07:07

## 2022-05-12 RX ADMIN — Medication 975 MILLIGRAM(S): at 21:20

## 2022-05-12 RX ADMIN — Medication 600 MILLIGRAM(S): at 12:00

## 2022-05-12 RX ADMIN — SENNA PLUS 2 TABLET(S): 8.6 TABLET ORAL at 06:25

## 2022-05-12 RX ADMIN — OXYCODONE HYDROCHLORIDE 5 MILLIGRAM(S): 5 TABLET ORAL at 00:25

## 2022-05-12 RX ADMIN — Medication 600 MILLIGRAM(S): at 23:53

## 2022-05-12 RX ADMIN — Medication 600 MILLIGRAM(S): at 00:20

## 2022-05-12 RX ADMIN — Medication 975 MILLIGRAM(S): at 14:23

## 2022-05-12 RX ADMIN — Medication 0.5 MILLILITER(S): at 06:26

## 2022-05-12 RX ADMIN — ENOXAPARIN SODIUM 40 MILLIGRAM(S): 100 INJECTION SUBCUTANEOUS at 06:25

## 2022-05-12 RX ADMIN — SIMETHICONE 80 MILLIGRAM(S): 80 TABLET, CHEWABLE ORAL at 21:12

## 2022-05-12 RX ADMIN — Medication 600 MILLIGRAM(S): at 23:23

## 2022-05-12 RX ADMIN — Medication 975 MILLIGRAM(S): at 09:10

## 2022-05-12 RX ADMIN — SIMETHICONE 80 MILLIGRAM(S): 80 TABLET, CHEWABLE ORAL at 06:24

## 2022-05-12 RX ADMIN — OXYCODONE HYDROCHLORIDE 5 MILLIGRAM(S): 5 TABLET ORAL at 00:34

## 2022-05-12 RX ADMIN — SIMETHICONE 80 MILLIGRAM(S): 80 TABLET, CHEWABLE ORAL at 14:23

## 2022-05-12 RX ADMIN — Medication 975 MILLIGRAM(S): at 09:40

## 2022-05-12 RX ADMIN — Medication 600 MILLIGRAM(S): at 18:17

## 2022-05-12 RX ADMIN — SENNA PLUS 2 TABLET(S): 8.6 TABLET ORAL at 18:16

## 2022-05-12 RX ADMIN — SIMETHICONE 80 MILLIGRAM(S): 80 TABLET, CHEWABLE ORAL at 18:17

## 2022-05-12 NOTE — DISCHARGE NOTE OB - MEDICATION SUMMARY - MEDICATIONS TO TAKE
I will START or STAY ON the medications listed below when I get home from the hospital:    acetaminophen 325 mg oral tablet  -- 3 tab(s) by mouth every 6 hours   -- Indication: For pain    ibuprofen 600 mg oral tablet  -- 1 tab(s) by mouth every 6 hours  -- Indication: For pain    oxyCODONE 5 mg oral tablet  -- 1 tab(s) by mouth every 6 hours, As Needed -Moderate to Severe Pain (4-10) MDD:MDD: 4  -- Indication: For pain    lanolin topical ointment  -- 1 application on skin every 6 hours, As needed, Sore Nipples  -- Indication: For Sore nipples    Prenatal Multivitamins with Folic Acid 1 mg oral tablet  -- 1 tab(s) by mouth once a day  -- Indication: For postpartum    senna oral tablet  -- 2 tab(s) by mouth 2 times a day  -- Indication: For Constipation    simethicone 80 mg oral tablet, chewable  -- 1 tab(s) by mouth every 4 hours  -- Indication: For Gas

## 2022-05-12 NOTE — DISCHARGE NOTE OB - CARE PLAN
1 Principal Discharge DX:	 delivery delivered  Assessment and plan of treatment:	healthy mother and baby  Secondary Diagnosis:	Acute on chronic anemia  Assessment and plan of treatment:	ferrous sulfate supplementation

## 2022-05-12 NOTE — DISCHARGE NOTE OB - CARE PROVIDER_API CALL
Dylan Porter)  Obstetrics and Gynecology  [address] [place] [address]  Phone: [phone]  Fax: [phone]  Established Patient  Follow Up Time: 1 week

## 2022-05-12 NOTE — PROGRESS NOTE ADULT - ASSESSMENT
A/P: 38yo now P3 POD#2 S/P repeat C/S (#3) with an EBL of 800cc, h/o beta thalassemia anemia s/p supplemental ferrous sulfate, h/o GDMA2 on insulin therapy; currently recovering well, recovering well   - encourage ambulation  - encourage PO hydration  - encourage incentive spirometry use  - monitor vitals, bleeding   - simethicone & senna   - DVT ppx: lovenox & SCDs  - diet: regular carb consistent   - s/p successful trial of void   - IVF hydration discontinued   - pain mgmt prn   - s/p one dose of IV venofer for acute on chronic blood loss anemia. Will be discharged home with supplemental ferrous sulfate   - FS ACqHS with low dose sliding scale insulin as needed. As POCT glucose ACqHS has been fairly well controlled, no need to discharge on insulin therapy. Will need to complete 75g GCT at 6 weeks postpartum   - anticipate discharge home tomorrow     Discussed with Dr. Porter

## 2022-05-12 NOTE — DISCHARGE NOTE OB - PATIENT PORTAL LINK FT
You can access the FollowMyHealth Patient Portal offered by [facility] by registering at the following website: [email or web address]. By joining [employer]’s FollowMyHealth portal, you will also be able to view your health information using other applications (apps) compatible with our system.

## 2022-05-12 NOTE — DISCHARGE NOTE OB - NS MD DC FALL RISK RISK
For information on Fall & Injury Prevention, visit: [email or web address] OR  [email or web address] OR  https://www.cdc.gov/steadi/patient.html

## 2022-05-12 NOTE — PROGRESS NOTE ADULT - SUBJECTIVE AND OBJECTIVE BOX
JUANITA JONATHANBOBBYPAOLA  37y  Female    PGY3 Note:    POD#2  Pt is recovering well, has suboptimal pain controll. Pt denies headache, chest pain, SOB, fever, chills, nausea, vomiting, extremity pain or swelling.   Pt denies headaches, vision changes, palpitations or RUQ pain. Pt denies palpitations, shortness of breath, dizziness, or syncope.     Ambulating: Yes  Voiding: Yes  Flatus: Yes  Bowel movements: Yes   Breast or bottle feeding: Both   Diet: Regular    MEDICATIONS  (STANDING):  acetaminophen     Tablet .. 975 milliGRAM(s) Oral <User Schedule>  dextrose 5%. 1000 milliLiter(s) (50 mL/Hr) IV Continuous <Continuous>  dextrose 5%. 1000 milliLiter(s) (100 mL/Hr) IV Continuous <Continuous>  dextrose 50% Injectable 25 Gram(s) IV Push once  dextrose 50% Injectable 12.5 Gram(s) IV Push once  dextrose 50% Injectable 25 Gram(s) IV Push once  diphtheria/tetanus/pertussis (acellular) Vaccine (ADAcel) 0.5 milliLiter(s) IntraMuscular once  enoxaparin Injectable 40 milliGRAM(s) SubCutaneous every 24 hours  glucagon  Injectable 1 milliGRAM(s) IntraMuscular once  ibuprofen  Tablet. 600 milliGRAM(s) Oral every 6 hours  insulin lispro (ADMELOG) corrective regimen sliding scale   SubCutaneous three times a day before meals  measles/mumps/rubella Vaccine 0.5 milliLiter(s) SubCutaneous once  oxytocin Infusion 333.333 milliUNIT(s)/Min (1000 mL/Hr) IV Continuous <Continuous>  prenatal multivitamin 1 Tablet(s) Oral daily  senna 2 Tablet(s) Oral two times a day  simethicone 80 milliGRAM(s) Chew every 4 hours    MEDICATIONS  (PRN):  dexAMETHasone  Injectable 4 milliGRAM(s) IV Push every 6 hours PRN Nausea  dextrose Oral Gel 15 Gram(s) Oral once PRN Blood Glucose LESS THAN 70 milliGRAM(s)/deciliter  diphenhydrAMINE 25 milliGRAM(s) Oral every 6 hours PRN Pruritus  lanolin Ointment 1 Application(s) Topical every 6 hours PRN Sore Nipples  magnesium hydroxide Suspension 30 milliLiter(s) Oral two times a day PRN Constipation  naloxone Injectable 0.1 milliGRAM(s) IV Push every 3 minutes PRN For ANY of the following changes in patient status:  A. Breaths Per Minute LESS THAN 10, B. Oxygen saturation LESS THAN 90%, C. Sedation score of 6 for Stop After: 4 Times  ondansetron Injectable 4 milliGRAM(s) IV Push every 6 hours PRN Nausea  oxyCODONE    IR 5 milliGRAM(s) Oral once PRN Moderate to Severe Pain (4-10)  oxyCODONE    IR 5 milliGRAM(s) Oral every 3 hours PRN Moderate to Severe Pain (4-10)      PAST MEDICAL & SURGICAL HISTORY:  Fibroadenoma  Gestational diabetes requiring insulin  Obesity  H/O beta thalassemia  H/O lumpectomy  H/O  section  S/P ovarian cystectomy  left      Physical Exam  Vital Signs Last 24 Hrs  T(C): 35.8 (12 May 2022 03:42), Max: 36.7 (11 May 2022 11:05)  T(F): 96.5 (12 May 2022 03:42), Max: 98.1 (11 May 2022 16:15)  HR: 90 (12 May 2022 03:42) (74 - 95)  BP: 124/74 (12 May 2022 03:42) (103/57 - 124/74)  RR: 18 (12 May 2022 03:42) (18 - 18)    Gen: AAOx3, NAD  Fundus: firm, below umbilicus   Wound: steri stripes in place, pfannenstiel incision c/d/i with no surrounding erythema or edema   Abd: Soft, appropriately tender near incision site, mildly distended, BS+  Lochia: minimal rubra   Ext: No calf tenderness, no swelling    Labs:                        8.1    7.72  )-----------( 271      ( 11 May 2022 18:52 )             25.7                         8.2    8.44  )-----------( 245      ( 11 May 2022 05:46 )             25.5      5/10 FS: 78/94/118   FS: 93/96/86/112/123

## 2022-05-13 VITALS
SYSTOLIC BLOOD PRESSURE: 104 MMHG | TEMPERATURE: 98 F | RESPIRATION RATE: 18 BRPM | DIASTOLIC BLOOD PRESSURE: 65 MMHG | HEART RATE: 82 BPM

## 2022-05-13 LAB
APPEARANCE UR: CLEAR — SIGNIFICANT CHANGE UP
BACTERIA # UR AUTO: NEGATIVE — SIGNIFICANT CHANGE UP
BILIRUB UR-MCNC: NEGATIVE — SIGNIFICANT CHANGE UP
COLOR SPEC: SIGNIFICANT CHANGE UP
DIFF PNL FLD: ABNORMAL
EPI CELLS # UR: 1 /HPF — SIGNIFICANT CHANGE UP (ref 0–5)
GLUCOSE BLDC GLUCOMTR-MCNC: 86 MG/DL — SIGNIFICANT CHANGE UP (ref 70–99)
GLUCOSE UR QL: NEGATIVE — SIGNIFICANT CHANGE UP
HYALINE CASTS # UR AUTO: 3 /LPF — SIGNIFICANT CHANGE UP (ref 0–7)
KETONES UR-MCNC: NEGATIVE — SIGNIFICANT CHANGE UP
LEUKOCYTE ESTERASE UR-ACNC: NEGATIVE — SIGNIFICANT CHANGE UP
NITRITE UR-MCNC: NEGATIVE — SIGNIFICANT CHANGE UP
PH UR: 7 — SIGNIFICANT CHANGE UP (ref 5–8)
PROT UR-MCNC: NEGATIVE — SIGNIFICANT CHANGE UP
RBC CASTS # UR COMP ASSIST: 12 /HPF — HIGH (ref 0–4)
SP GR SPEC: 1.01 — SIGNIFICANT CHANGE UP (ref 1.01–1.03)
SURGICAL PATHOLOGY STUDY: SIGNIFICANT CHANGE UP
UROBILINOGEN FLD QL: SIGNIFICANT CHANGE UP
WBC UR QL: 1 /HPF — SIGNIFICANT CHANGE UP (ref 0–5)

## 2022-05-13 PROCEDURE — 99238 HOSP IP/OBS DSCHRG MGMT 30/<: CPT

## 2022-05-13 RX ADMIN — ENOXAPARIN SODIUM 40 MILLIGRAM(S): 100 INJECTION SUBCUTANEOUS at 09:43

## 2022-05-13 RX ADMIN — Medication 975 MILLIGRAM(S): at 11:32

## 2022-05-13 RX ADMIN — SIMETHICONE 80 MILLIGRAM(S): 80 TABLET, CHEWABLE ORAL at 06:15

## 2022-05-13 RX ADMIN — Medication 600 MILLIGRAM(S): at 06:15

## 2022-05-13 RX ADMIN — Medication 600 MILLIGRAM(S): at 06:45

## 2022-05-13 RX ADMIN — SENNA PLUS 2 TABLET(S): 8.6 TABLET ORAL at 06:15

## 2022-05-13 RX ADMIN — Medication 975 MILLIGRAM(S): at 15:14

## 2022-05-13 RX ADMIN — Medication 975 MILLIGRAM(S): at 09:42

## 2022-05-13 RX ADMIN — SIMETHICONE 80 MILLIGRAM(S): 80 TABLET, CHEWABLE ORAL at 15:13

## 2022-05-13 NOTE — PROGRESS NOTE ADULT - ATTENDING COMMENTS
Patient seen, case discussed with resident  POD#3 repeat  #3 doing well.  GDMA2 --> FS well controlled w/o insulin postpartum - counseled on diet/exercise - will do 75g OGTT 6weeks PP.   Acute on chronic anemia - s/p IV venofer, will send on PO iron  Stable for d/c home.   F/u 1 week for incision check and 6 weeks for postpartum visit.  Precautions discussed. Patient seen, case discussed with resident  POD#3 repeat  #3 doing well.  GDMA2 --> FS well controlled w/o insulin postpartum - counseled on diet/exercise - will do 75g OGTT 6weeks PP.   Acute on chronic anemia - s/p IV venofer, will send on PO iron  Stable for d/c home.   F/u 1-2 week for incision check and 6 weeks for postpartum visit.  Precautions discussed.

## 2022-05-13 NOTE — PROGRESS NOTE ADULT - SUBJECTIVE AND OBJECTIVE BOX
JUANITA POWELLTEENA  37y  Female    PGY3 Note:    POD#3  Pt is recovering well, no acute complaints. Pt denies headache, chest pain, SOB, fever, chills, nausea, vomiting, extremity pain or swelling.     Pt denies vision changes, palpitations or RUQ pain. Pt denies shortness of breath, dizziness, or syncope.     Ambulating: Yes  Voiding: Yes  Flatus: Yes  Bowel movements: Yes   Breast or bottle feeding: Both   Diet: Regular    MEDICATIONS  (STANDING):  acetaminophen     Tablet .. 975 milliGRAM(s) Oral <User Schedule>  dextrose 5%. 1000 milliLiter(s) (50 mL/Hr) IV Continuous <Continuous>  dextrose 5%. 1000 milliLiter(s) (100 mL/Hr) IV Continuous <Continuous>  dextrose 50% Injectable 25 Gram(s) IV Push once  dextrose 50% Injectable 12.5 Gram(s) IV Push once  dextrose 50% Injectable 25 Gram(s) IV Push once  diphtheria/tetanus/pertussis (acellular) Vaccine (ADAcel) 0.5 milliLiter(s) IntraMuscular once  enoxaparin Injectable 40 milliGRAM(s) SubCutaneous every 24 hours  glucagon  Injectable 1 milliGRAM(s) IntraMuscular once  ibuprofen  Tablet. 600 milliGRAM(s) Oral every 6 hours  insulin lispro (ADMELOG) corrective regimen sliding scale   SubCutaneous three times a day before meals  oxytocin Infusion 333.333 milliUNIT(s)/Min (1000 mL/Hr) IV Continuous <Continuous>  prenatal multivitamin 1 Tablet(s) Oral daily  senna 2 Tablet(s) Oral two times a day  simethicone 80 milliGRAM(s) Chew every 4 hours    MEDICATIONS  (PRN):  dexAMETHasone  Injectable 4 milliGRAM(s) IV Push every 6 hours PRN Nausea  dextrose Oral Gel 15 Gram(s) Oral once PRN Blood Glucose LESS THAN 70 milliGRAM(s)/deciliter  diphenhydrAMINE 25 milliGRAM(s) Oral every 6 hours PRN Pruritus  lanolin Ointment 1 Application(s) Topical every 6 hours PRN Sore Nipples  magnesium hydroxide Suspension 30 milliLiter(s) Oral two times a day PRN Constipation  naloxone Injectable 0.1 milliGRAM(s) IV Push every 3 minutes PRN For ANY of the following changes in patient status:  A. Breaths Per Minute LESS THAN 10, B. Oxygen saturation LESS THAN 90%, C. Sedation score of 6 for Stop After: 4 Times  ondansetron Injectable 4 milliGRAM(s) IV Push every 6 hours PRN Nausea  oxyCODONE    IR 5 milliGRAM(s) Oral once PRN Moderate to Severe Pain (4-10)  oxyCODONE    IR 5 milliGRAM(s) Oral every 3 hours PRN Moderate to Severe Pain (4-10)      PAST MEDICAL & SURGICAL HISTORY:  Fibroadenoma  Gestational diabetes requiring insulin  Obesity  H/O beta thalassemia  H/O lumpectomy  H/O  section  S/P ovarian cystectomy  left      Physical Exam  Vital Signs Last 24 Hrs  T(C): 36.4 (12 May 2022 23:54), Max: 36.7 (12 May 2022 07:04)  T(F): 97.6 (12 May 2022 23:54), Max: 98 (12 May 2022 07:04)  HR: 80 (12 May 2022 23:54) (80 - 83)  BP: 110/65 (12 May 2022 23:54) (106/58 - 110/65)  RR: 18 (12 May 2022 23:54) (18 - 18)      Gen: AAOx3, NAD  Fundus: firm, below umbilicus   Wound: steris in place, pfannenstiel incision c/d/i with no surrounding erythema or edema    Abd: Soft, appropriately tender near incision site, mildly distended, BS+  Lochia: minimal rubra   Ext: No calf tenderness, no swelling    Labs:                        8.1    7.72  )-----------( 271      ( 11 May 2022 18:52 )             25.7                         8.2    8.44  )-----------( 245      ( 11 May 2022 05:46 )             25.5

## 2022-05-13 NOTE — PROGRESS NOTE ADULT - ASSESSMENT
A/P: 36yo now P3 POD#3 S/P repeat C/S (#3) with an EBL of 800cc, h/o beta thalassemia anemia s/p supplemental ferrous sulfate, h/o GDMA2 on insulin therapy; currently recovering well, recovering well   - encourage ambulation  - encourage PO hydration  - encourage incentive spirometry use  - monitor vitals, bleeding   - simethicone & senna   - DVT ppx: lovenox & SCDs  - diet: regular carb consistent   - s/p successful trial of void   - IVF hydration discontinued   - pain mgmt prn   - s/p one dose of IV venofer for acute on chronic blood loss anemia. Will be discharged home with supplemental ferrous sulfate   - FS ACqHS with low dose sliding scale insulin as needed. As POCT glucose ACqHS has been fairly well controlled, no need to discharge on insulin therapy. Will need to complete 75g GCT at 6 weeks postpartum   - anticipate discharge home today with follow-up in one week for an incision check     Discussed with Dr. Porter A/P: 38yo now P3 POD#3 S/P repeat C/S (#3) with an EBL of 800cc, h/o beta thalassemia anemia s/p supplemental ferrous sulfate, h/o GDMA2 on insulin therapy; currently recovering well, recovering well   - encourage ambulation  - encourage PO hydration  - encourage incentive spirometry use  - monitor vitals, bleeding   - simethicone & senna   - DVT ppx: lovenox & SCDs  - diet: regular carb consistent   - s/p successful trial of void   - IVF hydration discontinued   - pain mgmt prn   - s/p one dose of IV venofer for acute on chronic blood loss anemia. Will be discharged home with supplemental ferrous sulfate   - FS ACqHS with low dose sliding scale insulin as needed. As POCT glucose ACqHS has been fairly well controlled, no need to discharge on insulin therapy. Will need to complete 75g GCT at 6 weeks postpartum   - anticipate discharge home today with follow-up in one week for an incision check     To be discussed with Dr. Buckner

## 2022-05-16 LAB
-  AMPICILLIN: SIGNIFICANT CHANGE UP
-  CIPROFLOXACIN: SIGNIFICANT CHANGE UP
-  LEVOFLOXACIN: SIGNIFICANT CHANGE UP
-  NITROFURANTOIN: SIGNIFICANT CHANGE UP
-  TETRACYCLINE: SIGNIFICANT CHANGE UP
-  VANCOMYCIN: SIGNIFICANT CHANGE UP
CULTURE RESULTS: SIGNIFICANT CHANGE UP
GLUCOSE BLDC GLUCOMTR-MCNC: 78 MG/DL — SIGNIFICANT CHANGE UP (ref 70–99)
METHOD TYPE: SIGNIFICANT CHANGE UP
ORGANISM # SPEC MICROSCOPIC CNT: SIGNIFICANT CHANGE UP
ORGANISM # SPEC MICROSCOPIC CNT: SIGNIFICANT CHANGE UP
SPECIMEN SOURCE: SIGNIFICANT CHANGE UP

## 2022-05-17 PROBLEM — Z86.2 PERSONAL HISTORY OF DISEASES OF THE BLOOD AND BLOOD-FORMING ORGANS AND CERTAIN DISORDERS INVOLVING THE IMMUNE MECHANISM: Chronic | Status: ACTIVE | Noted: 2022-05-10

## 2022-05-17 PROBLEM — E66.9 OBESITY, UNSPECIFIED: Chronic | Status: ACTIVE | Noted: 2022-05-10

## 2022-05-17 PROBLEM — D24.9 BENIGN NEOPLASM OF UNSPECIFIED BREAST: Chronic | Status: ACTIVE | Noted: 2022-05-10

## 2022-05-17 PROBLEM — O24.414 GESTATIONAL DIABETES MELLITUS IN PREGNANCY, INSULIN CONTROLLED: Chronic | Status: ACTIVE | Noted: 2022-05-10

## 2022-05-18 DIAGNOSIS — O34.211 MATERNAL CARE FOR LOW TRANSVERSE SCAR FROM PREVIOUS CESAREAN DELIVERY: ICD-10-CM

## 2022-05-18 DIAGNOSIS — D62 ACUTE POSTHEMORRHAGIC ANEMIA: ICD-10-CM

## 2022-05-18 DIAGNOSIS — O34.03 MATERNAL CARE FOR UNSPECIFIED CONGENITAL MALFORMATION OF UTERUS, THIRD TRIMESTER: ICD-10-CM

## 2022-05-18 DIAGNOSIS — Q51.3 BICORNATE UTERUS: ICD-10-CM

## 2022-05-18 DIAGNOSIS — Z3A.38 38 WEEKS GESTATION OF PREGNANCY: ICD-10-CM

## 2022-05-18 DIAGNOSIS — D56.3 THALASSEMIA MINOR: ICD-10-CM

## 2022-05-19 ENCOUNTER — APPOINTMENT (OUTPATIENT)
Dept: ANTEPARTUM | Facility: CLINIC | Age: 37
End: 2022-05-19

## 2022-05-26 ENCOUNTER — APPOINTMENT (OUTPATIENT)
Dept: OBGYN | Facility: CLINIC | Age: 37
End: 2022-05-26
Payer: MEDICAID

## 2022-05-26 ENCOUNTER — APPOINTMENT (OUTPATIENT)
Dept: ANTEPARTUM | Facility: CLINIC | Age: 37
End: 2022-05-26

## 2022-05-26 DIAGNOSIS — Z86.32 PERSONAL HISTORY OF GESTATIONAL DIABETES: ICD-10-CM

## 2022-05-26 RX ORDER — COPPER 313.4 MG/1
INTRAUTERINE DEVICE INTRAUTERINE
Qty: 1 | Refills: 0 | Status: ACTIVE | COMMUNITY
Start: 2022-05-26 | End: 1900-01-01

## 2022-05-26 NOTE — HISTORY OF PRESENT ILLNESS
[Repeat C/S] : delivered by  section (repeat) [Breastfeeding] : currently nursing [BF with Difficulty] : nursing with difficulty [Intended Contraception] : Intended Contraception: [IUD] : intrauterine device [None] : No associated symptoms are reported [Clean/Dry/Intact] : clean, dry and intact [Erythema] : not erythematous [Swelling] : not swollen [Dehiscence] : not dehisced [Healed] : healed

## 2022-06-06 NOTE — OB RN PATIENT PROFILE - FUNCTIONAL ASSESSMENT - DAILY ACTIVITY SCORE.
24 Methotrexate Pregnancy And Lactation Text: This medication is Pregnancy Category X and is known to cause fetal harm. This medication is excreted in breast milk.

## 2022-06-23 ENCOUNTER — APPOINTMENT (OUTPATIENT)
Dept: OBGYN | Facility: CLINIC | Age: 37
End: 2022-06-23

## 2022-06-23 DIAGNOSIS — Z00.00 ENCOUNTER FOR GENERAL ADULT MEDICAL EXAMINATION W/OUT ABNORMAL FINDINGS: ICD-10-CM

## 2022-06-23 PROCEDURE — 58300 INSERT INTRAUTERINE DEVICE: CPT

## 2022-06-23 NOTE — PROCEDURE
[IUD Placement] : intrauterine device (IUD) placement [Time out performed] : Pre-procedure time out performed.  Patient's name, date of birth and procedure confirmed. [Consent Obtained] : Consent obtained [Risks] : risks [Benefits] : benefits [Alternatives] : alternatives [Patient] : patient [Infection] : infection [Bleeding] : bleeding [Pain] : pain [Expulsion] : expulsion [Failure] : failure [Uterine Perforation] : uterine perforation [Neg Pregnancy Test] : negative pregnancy test [Betadine] : Betadine [Tenaculum] : Tenaculum [Easy Passage] : Easy passage [Sounded to ___ cm] : sounded to [unfilled] ~Ucm [ParaGard IUD] : ParaGard IUD [Tolerated Well] : Patient tolerated the procedure well [No Complications] : No complications [de-identified] : [id number] [de-identified] : July 2026 [de-identified] : 6/22/2032

## 2022-06-23 NOTE — HISTORY OF PRESENT ILLNESS
[FreeTextEntry1] : Patient here for IUD insertion.\par No complaints.\par Also needs postpartum fasting glucose and a1c.\par Upreg negative\par consent signed.\par

## 2022-06-26 LAB
ESTIMATED AVERAGE GLUCOSE: 103 MG/DL
GLUCOSE BS SERPL-MCNC: 95 MG/DL
HBA1C MFR BLD HPLC: 5.2 %

## 2022-07-21 ENCOUNTER — APPOINTMENT (OUTPATIENT)
Dept: OBGYN | Facility: CLINIC | Age: 37
End: 2022-07-21

## 2022-07-21 DIAGNOSIS — Z97.5 PRESENCE OF (INTRAUTERINE) CONTRACEPTIVE DEVICE: ICD-10-CM

## 2022-07-21 PROCEDURE — 99213 OFFICE O/P EST LOW 20 MIN: CPT

## 2022-07-21 NOTE — PHYSICAL EXAM
[Chaperone Present] : A chaperone was present in the examining room during all aspects of the physical examination [Appropriately responsive] : appropriately responsive [Alert] : alert [No Acute Distress] : no acute distress [Labia Majora] : normal [Labia Minora] : normal [IUD String] : an IUD string was noted [Normal] : normal [Uterine Adnexae] : normal

## 2022-08-01 LAB
BILIRUB UR QL STRIP: NEGATIVE
CLARITY UR: CLEAR
COLLECTION METHOD: NORMAL
GLUCOSE BLDC GLUCOMTR-MCNC: 75
GLUCOSE UR-MCNC: NEGATIVE
HCG UR QL: 0.2 EU/DL
HGB UR QL STRIP.AUTO: NEGATIVE
KETONES UR-MCNC: NEGATIVE
LEUKOCYTE ESTERASE UR QL STRIP: NEGATIVE
NITRITE UR QL STRIP: NEGATIVE
PH UR STRIP: 6
PROT UR STRIP-MCNC: NEGATIVE
SP GR UR STRIP: 1.02

## 2022-08-02 ENCOUNTER — APPOINTMENT (OUTPATIENT)
Dept: OBGYN | Facility: CLINIC | Age: 37
End: 2022-08-02

## 2022-08-02 ENCOUNTER — OUTPATIENT (OUTPATIENT)
Dept: OUTPATIENT SERVICES | Facility: HOSPITAL | Age: 37
LOS: 1 days | Discharge: HOME | End: 2022-08-02

## 2022-08-02 VITALS
BODY MASS INDEX: 33.43 KG/M2 | SYSTOLIC BLOOD PRESSURE: 110 MMHG | HEIGHT: 66 IN | WEIGHT: 208 LBS | DIASTOLIC BLOOD PRESSURE: 70 MMHG

## 2022-08-02 DIAGNOSIS — Z98.891 HISTORY OF UTERINE SCAR FROM PREVIOUS SURGERY: Chronic | ICD-10-CM

## 2022-08-02 DIAGNOSIS — Z98.890 OTHER SPECIFIED POSTPROCEDURAL STATES: Chronic | ICD-10-CM

## 2022-08-02 PROCEDURE — 99213 OFFICE O/P EST LOW 20 MIN: CPT

## 2022-08-02 RX ORDER — DICLOXACILLIN SODIUM 500 MG/1
500 CAPSULE ORAL 4 TIMES DAILY
Qty: 40 | Refills: 0 | Status: ACTIVE | COMMUNITY
Start: 2022-08-02 | End: 1900-01-01

## 2022-08-02 NOTE — HISTORY OF PRESENT ILLNESS
[FreeTextEntry1] : 38 y/o female s/p delivery breastfeeding wioth breast pain. Went to Urgent care-given abx, called pcp and was advisewd to go to OBGYN>\par no fever, no masses, just tender throughout b/l

## 2022-08-02 NOTE — PHYSICAL EXAM
[Normal] : normal [Tenderness Of The Right Breast] : tenderness [Tenderness Of The Left Breast] : tenderness [No Masses] : no breast masses were palpable

## 2023-02-02 ENCOUNTER — APPOINTMENT (OUTPATIENT)
Dept: OBGYN | Facility: CLINIC | Age: 38
End: 2023-02-02
Payer: MEDICAID

## 2023-02-02 VITALS
BODY MASS INDEX: 34.07 KG/M2 | HEIGHT: 66 IN | DIASTOLIC BLOOD PRESSURE: 78 MMHG | WEIGHT: 212 LBS | SYSTOLIC BLOOD PRESSURE: 110 MMHG

## 2023-02-02 LAB
ALBUMIN SERPL ELPH-MCNC: 4.7 G/DL
ALP BLD-CCNC: 49 U/L
ALT SERPL-CCNC: 26 U/L
ANION GAP SERPL CALC-SCNC: 12 MMOL/L
AST SERPL-CCNC: 14 U/L
BASOPHILS # BLD AUTO: 0.05 K/UL
BASOPHILS NFR BLD AUTO: 0.8 %
BILIRUB SERPL-MCNC: 0.9 MG/DL
BUN SERPL-MCNC: 11 MG/DL
CALCIUM SERPL-MCNC: 9.8 MG/DL
CHLORIDE SERPL-SCNC: 101 MMOL/L
CHOLEST SERPL-MCNC: 180 MG/DL
CO2 SERPL-SCNC: 26 MMOL/L
CREAT SERPL-MCNC: 0.6 MG/DL
EGFR: 118 ML/MIN/1.73M2
EOSINOPHIL # BLD AUTO: 0.17 K/UL
EOSINOPHIL NFR BLD AUTO: 2.7 %
ESTIMATED AVERAGE GLUCOSE: 140 MG/DL
GLUCOSE BS SERPL-MCNC: 110 MG/DL
GLUCOSE SERPL-MCNC: 115 MG/DL
HBA1C MFR BLD HPLC: 6.5 %
HCT VFR BLD CALC: 36.5 %
HDLC SERPL-MCNC: 37 MG/DL
HGB BLD-MCNC: 11.5 G/DL
IMM GRANULOCYTES NFR BLD AUTO: 0.5 %
LDLC SERPL CALC-MCNC: 110 MG/DL
LYMPHOCYTES # BLD AUTO: 2.64 K/UL
LYMPHOCYTES NFR BLD AUTO: 42.3 %
MAN DIFF?: NORMAL
MCHC RBC-ENTMCNC: 19 PG
MCHC RBC-ENTMCNC: 31.5 G/DL
MCV RBC AUTO: 60.2 FL
MONOCYTES # BLD AUTO: 0.42 K/UL
MONOCYTES NFR BLD AUTO: 6.7 %
NEUTROPHILS # BLD AUTO: 2.93 K/UL
NEUTROPHILS NFR BLD AUTO: 47 %
NONHDLC SERPL-MCNC: 143 MG/DL
PLATELET # BLD AUTO: 265 K/UL
POTASSIUM SERPL-SCNC: 4.9 MMOL/L
PROT SERPL-MCNC: 7.4 G/DL
RBC # BLD: 6.06 M/UL
RBC # FLD: 17.7 %
SODIUM SERPL-SCNC: 139 MMOL/L
TRIGL SERPL-MCNC: 165 MG/DL
TSH SERPL-ACNC: 2.21 UIU/ML
WBC # FLD AUTO: 6.24 K/UL

## 2023-02-02 PROCEDURE — 99395 PREV VISIT EST AGE 18-39: CPT

## 2023-02-02 NOTE — PLAN
[FreeTextEntry1] : Normal Annual\par Pap/hpv done\par H/O GDM - screen for diabetes.\par f/u 1 year or prn.

## 2023-02-02 NOTE — HISTORY OF PRESENT ILLNESS
[FreeTextEntry1] : 37 here for annual.\par No gyn complaints.\par  [Patient reported PAP Smear was normal] : Patient reported PAP Smear was normal [PapSmeardate] : 2020 [Currently Active] : currently active [Men] : men [Vaginal] : vaginal [No] : No [FreeTextEntry3] : IUD

## 2023-02-06 LAB
CYTOLOGY CVX/VAG DOC THIN PREP: NORMAL
HPV HIGH+LOW RISK DNA PNL CVX: NOT DETECTED

## 2023-03-20 ENCOUNTER — NON-APPOINTMENT (OUTPATIENT)
Age: 38
End: 2023-03-20

## 2024-02-08 ENCOUNTER — APPOINTMENT (OUTPATIENT)
Dept: OBGYN | Facility: CLINIC | Age: 39
End: 2024-02-08
Payer: COMMERCIAL

## 2024-02-08 VITALS — WEIGHT: 196 LBS | SYSTOLIC BLOOD PRESSURE: 120 MMHG | DIASTOLIC BLOOD PRESSURE: 78 MMHG | BODY MASS INDEX: 31.64 KG/M2

## 2024-02-08 DIAGNOSIS — E13.9 OTHER SPECIFIED DIABETES MELLITUS W/OUT COMPLICATIONS: ICD-10-CM

## 2024-02-08 DIAGNOSIS — Z01.419 ENCOUNTER FOR GYNECOLOGICAL EXAMINATION (GENERAL) (ROUTINE) W/OUT ABNORMAL FINDINGS: ICD-10-CM

## 2024-02-08 DIAGNOSIS — Z86.2 PERSONAL HISTORY OF DISEASES OF THE BLOOD AND BLOOD-FORMING ORGANS AND CERTAIN DISORDERS INVOLVING THE IMMUNE MECHANISM: ICD-10-CM

## 2024-02-08 PROCEDURE — 99395 PREV VISIT EST AGE 18-39: CPT

## 2024-02-08 RX ORDER — METFORMIN HYDROCHLORIDE 625 MG/1
TABLET ORAL
Refills: 0 | Status: ACTIVE | COMMUNITY

## 2024-02-08 NOTE — PHYSICAL EXAM
[Chaperone Present] : A chaperone was present in the examining room during all aspects of the physical examination [Appropriately responsive] : appropriately responsive [Alert] : alert [No Acute Distress] : no acute distress [Soft] : soft [Non-tender] : non-tender [Non-distended] : non-distended [No HSM] : No HSM [No Lesions] : no lesions [No Mass] : no mass [Oriented x3] : oriented x3 [Examination Of The Breasts] : a normal appearance [No Masses] : no breast masses were palpable [Labia Majora] : normal [Labia Minora] : normal [IUD String] : an IUD string was noted [Normal] : normal [Uterine Adnexae] : normal [FreeTextEntry5] : string is very short.

## 2024-02-08 NOTE — HISTORY OF PRESENT ILLNESS
[FreeTextEntry1] : 37 y/o  LMP 1/22/2024, here today for annual exam.  Patient has IUD - Paragard - placed June 2022 Denies abnormal vaginal discharge, pelvic pain, urinary symptoms, irregular periods, or breast changes.    Last Annual: 2/2023 Last PAP: 2/2023

## 2024-10-24 ENCOUNTER — APPOINTMENT (OUTPATIENT)
Dept: OBGYN | Facility: CLINIC | Age: 39
End: 2024-10-24
Payer: COMMERCIAL

## 2024-10-24 DIAGNOSIS — N76.0 ACUTE VAGINITIS: ICD-10-CM

## 2024-10-24 PROCEDURE — 99459 PELVIC EXAMINATION: CPT

## 2024-10-24 PROCEDURE — 99213 OFFICE O/P EST LOW 20 MIN: CPT

## 2024-10-24 RX ORDER — FLUCONAZOLE 150 MG/1
150 TABLET ORAL
Qty: 1 | Refills: 6 | Status: ACTIVE | COMMUNITY
Start: 2024-10-24 | End: 1900-01-01

## 2024-10-24 RX ORDER — CLOTRIMAZOLE AND BETAMETHASONE DIPROPIONATE 10; .5 MG/G; MG/G
1-0.05 CREAM TOPICAL TWICE DAILY
Qty: 1 | Refills: 0 | Status: ACTIVE | COMMUNITY
Start: 2024-10-24 | End: 1900-01-01

## 2024-10-31 LAB
BV BACTERIA RRNA VAG QL NAA+PROBE: NOT DETECTED
C GLABRATA RNA VAG QL NAA+PROBE: NOT DETECTED
C TRACH RRNA SPEC QL NAA+PROBE: NOT DETECTED
CANDIDA RRNA VAG QL PROBE: DETECTED
N GONORRHOEA RRNA SPEC QL NAA+PROBE: NOT DETECTED
T VAGINALIS RRNA SPEC QL NAA+PROBE: NOT DETECTED

## 2025-02-20 ENCOUNTER — APPOINTMENT (OUTPATIENT)
Dept: OBGYN | Facility: CLINIC | Age: 40
End: 2025-02-20
Payer: COMMERCIAL

## 2025-02-20 VITALS
BODY MASS INDEX: 31.5 KG/M2 | DIASTOLIC BLOOD PRESSURE: 74 MMHG | HEIGHT: 66 IN | WEIGHT: 196 LBS | SYSTOLIC BLOOD PRESSURE: 118 MMHG

## 2025-02-20 DIAGNOSIS — Z01.419 ENCOUNTER FOR GYNECOLOGICAL EXAMINATION (GENERAL) (ROUTINE) W/OUT ABNORMAL FINDINGS: ICD-10-CM

## 2025-02-20 DIAGNOSIS — N89.8 OTHER SPECIFIED NONINFLAMMATORY DISORDERS OF VAGINA: ICD-10-CM

## 2025-02-20 DIAGNOSIS — N76.3 SUBACUTE AND CHRONIC VULVITIS: ICD-10-CM

## 2025-02-20 PROCEDURE — 99213 OFFICE O/P EST LOW 20 MIN: CPT | Mod: 25

## 2025-02-20 PROCEDURE — 99396 PREV VISIT EST AGE 40-64: CPT

## 2025-02-20 PROCEDURE — 99459 PELVIC EXAMINATION: CPT | Mod: NC

## 2025-02-20 RX ORDER — CLOTRIMAZOLE AND BETAMETHASONE DIPROPIONATE 10; .5 MG/G; MG/G
1-0.05 CREAM TOPICAL TWICE DAILY
Qty: 1 | Refills: 1 | Status: ACTIVE | COMMUNITY
Start: 2025-02-20 | End: 1900-01-01
